# Patient Record
Sex: FEMALE | Race: WHITE | Employment: FULL TIME | URBAN - METROPOLITAN AREA
[De-identification: names, ages, dates, MRNs, and addresses within clinical notes are randomized per-mention and may not be internally consistent; named-entity substitution may affect disease eponyms.]

---

## 2024-01-26 ENCOUNTER — OFFICE VISIT (OUTPATIENT)
Dept: OBGYN CLINIC | Age: 34
End: 2024-01-26

## 2024-01-26 VITALS
HEIGHT: 67 IN | WEIGHT: 175 LBS | SYSTOLIC BLOOD PRESSURE: 132 MMHG | TEMPERATURE: 99.9 F | HEART RATE: 90 BPM | BODY MASS INDEX: 27.47 KG/M2 | DIASTOLIC BLOOD PRESSURE: 82 MMHG

## 2024-01-26 DIAGNOSIS — Z12.4 PAP SMEAR FOR CERVICAL CANCER SCREENING: ICD-10-CM

## 2024-01-26 DIAGNOSIS — Z01.411 ENCNTR FOR GYN EXAM (GENERAL) (ROUTINE) W ABNORMAL FINDINGS: Primary | ICD-10-CM

## 2024-01-26 DIAGNOSIS — Z11.3 ROUTINE SCREENING FOR STI (SEXUALLY TRANSMITTED INFECTION): ICD-10-CM

## 2024-01-26 DIAGNOSIS — N92.6 MISSED MENSES: ICD-10-CM

## 2024-01-26 DIAGNOSIS — Z32.01 POSITIVE PREGNANCY TEST: ICD-10-CM

## 2024-01-26 RX ORDER — TRETINOIN 0.5 MG/G
CREAM TOPICAL NIGHTLY
COMMUNITY

## 2024-01-26 ASSESSMENT — ENCOUNTER SYMPTOMS
DIARRHEA: 0
CONSTIPATION: 0
SORE THROAT: 0
VOMITING: 0
COUGH: 0
NAUSEA: 0
SHORTNESS OF BREATH: 0
ABDOMINAL PAIN: 0

## 2024-01-26 NOTE — PROGRESS NOTES
Annual Exam      CC:   Chief Complaint   Patient presents with    New Patient     amenorrhea       HPI:  33 y.o. G0 presents for her gynecologic annual exam.    Patient seen and examined. Doing well today.     Reports menses have been regular.  Occurring every 21 days.  Lasting 5 days.  Heavier bleeding first 2 days and then light.  Denies pain with menses.  [facility] 12/6/2023.     Medical history significant for acne.  Surgical history includes 4 knee surgeries.      Health Maintenance:  Birth control: None   Pregnancy plans: Actively trying   Safe relationship:  - together 4.5 years   Healthy diet: No specific plan   Exercise: 3-5 days a week     Screening:  Last pap smear: 2-3 years ago   History of abnormal pap smears: denies    Vaccines:  Gardasil vaccine: Has had   Flu vaccine: Has had   COVID-19 vaccine: Had series    Review of Systems:   Review of Systems   Constitutional:  Negative for chills and fever.   HENT:  Negative for congestion and sore throat.    Respiratory:  Negative for cough and shortness of breath.    Cardiovascular:  Negative for chest pain and palpitations.   Gastrointestinal:  Negative for abdominal pain, constipation, diarrhea, nausea and vomiting.   Genitourinary:  Positive for menstrual problem. Negative for dysuria, frequency, pelvic pain and vaginal discharge.   Neurological:  Negative for dizziness and headaches.   Breast: Denies skin changes, nipple discharge, lesions, dimpling, tenderness or palpable masses     Primary Care Physician: Alexandra Roque APRN - SANDRO    Obstetric History  OB History   No obstetric history on file.       GynecologicHistory  Menstrual History:  LMP: Patient's last menstrual period was 12/06/2023 (exact date).   Age of Menarche: 14  Menstrual Period: regular  Interval Between Menses: 21 days  Duration of Menses: 5 days  Menstrual Flow: moderate  Painful menses: denies    Sexual History:  Contraception: see above  Currently is sexually active  5

## 2024-01-27 LAB
REASON FOR REJECTION: NORMAL
REJECTED TEST: NORMAL

## 2024-01-28 LAB — BACTERIA UR CULT: NORMAL

## 2024-01-29 LAB
C TRACH DNA CVX QL NAA+PROBE: NEGATIVE
N GONORRHOEA DNA CERV MUCUS QL NAA+PROBE: NEGATIVE

## 2024-02-02 LAB
HPV HR 12 DNA SPEC QL NAA+PROBE: NOT DETECTED
HPV16 DNA SPEC QL NAA+PROBE: NOT DETECTED
HPV16+18+H RISK 12 DNA SPEC-IMP: NORMAL
HPV18 DNA SPEC QL NAA+PROBE: NOT DETECTED

## 2024-02-21 ENCOUNTER — INITIAL PRENATAL (OUTPATIENT)
Dept: OBGYN CLINIC | Age: 34
End: 2024-02-21
Payer: COMMERCIAL

## 2024-02-21 VITALS
SYSTOLIC BLOOD PRESSURE: 104 MMHG | WEIGHT: 176.2 LBS | DIASTOLIC BLOOD PRESSURE: 62 MMHG | BODY MASS INDEX: 27.6 KG/M2 | HEART RATE: 101 BPM

## 2024-02-21 DIAGNOSIS — Z34.01 ENCOUNTER FOR PRENATAL CARE IN FIRST TRIMESTER OF FIRST PREGNANCY: Primary | ICD-10-CM

## 2024-02-21 DIAGNOSIS — O21.9 NAUSEA AND VOMITING DURING PREGNANCY: ICD-10-CM

## 2024-02-21 PROCEDURE — 0500F INITIAL PRENATAL CARE VISIT: CPT | Performed by: OBSTETRICS & GYNECOLOGY

## 2024-02-21 PROCEDURE — 36415 COLL VENOUS BLD VENIPUNCTURE: CPT | Performed by: OBSTETRICS & GYNECOLOGY

## 2024-02-21 RX ORDER — PYRIDOXINE HCL (VITAMIN B6) 25 MG
25 TABLET ORAL 3 TIMES DAILY
Qty: 90 TABLET | Refills: 3 | Status: SHIPPED | OUTPATIENT
Start: 2024-02-21

## 2024-02-21 NOTE — PROGRESS NOTES
Maternal emotional well being screening form completed and reviewed with patient. Current score is 0.     
AIDED ANT CRUCIATE LIGM RPR/AGMNTJ/RCNSTJ Left 9/20/2018    LEFT KNEE ARTHROSCOPY REVISION OF ANTERIOR CRUCIATE LIGAMENT TEAR WITH HAMSTRING performed by Nader Moya MD at [facility] OR    TN REMOVAL IMPLANT DEEP Left 9/20/2018    REMOVAL OF PAINFUL HARDWARE LEFT KNEE performed by Nader Moya MD at [facility] OR       Family History:  Family History   Problem Relation Age of Onset    Cancer Mother         skin    Cancer Paternal Grandmother         cervical    Diabetes Paternal Grandmother     Cancer Paternal Grandfather         lung    Heart Surgery Maternal Grandmother        Social History:  Social History     Socioeconomic History    Marital status:      Spouse name: None    Number of children: None    Years of education: None    Highest education level: None   Tobacco Use    Smoking status: Never    Smokeless tobacco: Never   Vaping Use    Vaping Use: Never used   Substance and Sexual Activity    Alcohol use: Yes     Alcohol/week: 1.0 standard drink of alcohol     Types: 1 Glasses of wine per week    Drug use: No    Sexual activity: Yes     Partners: Male     Comment: one current     Social Determinants of Health     Financial Resource Strain: Low Risk  (9/1/2022)    Overall Financial Resource Strain (CARDIA)     Difficulty of Paying Living Expenses: Not hard at all   Food Insecurity: No Food Insecurity (9/1/2022)    Hunger Vital Sign     Worried About Running Out of Food in the Last Year: Never true     Ran Out of Food in the Last Year: Never true       Physical Exam:  /62   Pulse (!) 101   Wt 79.9 kg (176 lb 3.2 oz)   LMP 12/06/2023 (Exact Date)   BMI 27.60 kg/m²     Physical Exam  Vitals reviewed.   Constitutional:       General: She is not in acute distress.     Appearance: She is well-developed.   HENT:      Head: Normocephalic and atraumatic.   Eyes:      Conjunctiva/sclera: Conjunctivae normal.   Cardiovascular:      Rate and Rhythm: Normal rate.   Pulmonary:      Effort: Pulmonary effort

## 2024-02-22 LAB
ABO + RH BLD: NORMAL
AMPHETAMINES UR QL SCN>1000 NG/ML: NORMAL
BARBITURATES UR QL SCN>200 NG/ML: NORMAL
BASOPHILS # BLD: 0.1 K/UL (ref 0–0.2)
BASOPHILS NFR BLD: 0.6 %
BENZODIAZ UR QL SCN>200 NG/ML: NORMAL
BLD GP AB SCN SERPL QL: NORMAL
BUPRENORPHINE+NOR UR QL SCN: NORMAL
CANNABINOIDS UR QL SCN>50 NG/ML: NORMAL
COCAINE UR QL SCN: NORMAL
DEPRECATED RDW RBC AUTO: 12.9 % (ref 12.4–15.4)
DRUG SCREEN COMMENT UR-IMP: NORMAL
EOSINOPHIL # BLD: 0.1 K/UL (ref 0–0.6)
EOSINOPHIL NFR BLD: 0.6 %
FENTANYL SCREEN, URINE: NORMAL
HBV SURFACE AG SERPL QL IA: NORMAL
HCT VFR BLD AUTO: 34.8 % (ref 36–48)
HCV AB S/CO SERPL IA: 0.05 IV
HCV AB SERPL QL IA: NEGATIVE
HGB BLD-MCNC: 12.2 G/DL (ref 12–16)
HIV 1+2 AB+HIV1 P24 AG SERPL QL IA: NORMAL
HIV 2 AB SERPL QL IA: NORMAL
HIV1 AB SERPL QL IA: NORMAL
HIV1 P24 AG SERPL QL IA: NORMAL
LYMPHOCYTES # BLD: 1.8 K/UL (ref 1–5.1)
LYMPHOCYTES NFR BLD: 18.8 %
MCH RBC QN AUTO: 31.3 PG (ref 26–34)
MCHC RBC AUTO-ENTMCNC: 35 G/DL (ref 31–36)
MCV RBC AUTO: 89.6 FL (ref 80–100)
METHADONE UR QL SCN>300 NG/ML: NORMAL
MONOCYTES # BLD: 0.8 K/UL (ref 0–1.3)
MONOCYTES NFR BLD: 7.9 %
NEUTROPHILS # BLD: 7.1 K/UL (ref 1.7–7.7)
NEUTROPHILS NFR BLD: 72.1 %
OPIATES UR QL SCN>300 NG/ML: NORMAL
OXYCODONE UR QL SCN: NORMAL
PCP UR QL SCN>25 NG/ML: NORMAL
PH UR STRIP: 7 [PH]
PLATELET # BLD AUTO: 263 K/UL (ref 135–450)
PMV BLD AUTO: 9.5 FL (ref 5–10.5)
RBC # BLD AUTO: 3.88 M/UL (ref 4–5.2)
REAGIN+T PALLIDUM IGG+IGM SERPL-IMP: NORMAL
RUBV IGG SERPL IA-ACNC: >500 IU/ML
TSH SERPL DL<=0.005 MIU/L-ACNC: 2.32 UIU/ML (ref 0.27–4.2)
VZV IGG SER QL IA: NORMAL
WBC # BLD AUTO: 9.8 K/UL (ref 4–11)

## 2024-03-18 SDOH — ECONOMIC STABILITY: FOOD INSECURITY: WITHIN THE PAST 12 MONTHS, THE FOOD YOU BOUGHT JUST DIDN'T LAST AND YOU DIDN'T HAVE MONEY TO GET MORE.: NEVER TRUE

## 2024-03-18 SDOH — ECONOMIC STABILITY: FOOD INSECURITY: WITHIN THE PAST 12 MONTHS, YOU WORRIED THAT YOUR FOOD WOULD RUN OUT BEFORE YOU GOT MONEY TO BUY MORE.: NEVER TRUE

## 2024-03-18 SDOH — ECONOMIC STABILITY: HOUSING INSECURITY
IN THE LAST 12 MONTHS, WAS THERE A TIME WHEN YOU DID NOT HAVE A STEADY PLACE TO SLEEP OR SLEPT IN A SHELTER (INCLUDING NOW)?: NO

## 2024-03-18 SDOH — ECONOMIC STABILITY: INCOME INSECURITY: HOW HARD IS IT FOR YOU TO PAY FOR THE VERY BASICS LIKE FOOD, HOUSING, MEDICAL CARE, AND HEATING?: NOT HARD AT ALL

## 2024-03-20 ENCOUNTER — ROUTINE PRENATAL (OUTPATIENT)
Dept: OBGYN CLINIC | Age: 34
End: 2024-03-20

## 2024-03-20 VITALS
BODY MASS INDEX: 28.22 KG/M2 | WEIGHT: 179.8 LBS | HEIGHT: 67 IN | HEART RATE: 55 BPM | OXYGEN SATURATION: 100 % | SYSTOLIC BLOOD PRESSURE: 138 MMHG | TEMPERATURE: 97.2 F | DIASTOLIC BLOOD PRESSURE: 88 MMHG

## 2024-03-20 DIAGNOSIS — Z34.02 ENCOUNTER FOR PRENATAL CARE IN SECOND TRIMESTER OF FIRST PREGNANCY: Primary | ICD-10-CM

## 2024-03-20 DIAGNOSIS — Z36.9 ANTENATAL SCREENING ENCOUNTER: ICD-10-CM

## 2024-03-20 DIAGNOSIS — O21.9 NAUSEA AND VOMITING DURING PREGNANCY: ICD-10-CM

## 2024-03-20 PROCEDURE — 0502F SUBSEQUENT PRENATAL CARE: CPT | Performed by: OBSTETRICS & GYNECOLOGY

## 2024-03-20 ASSESSMENT — PATIENT HEALTH QUESTIONNAIRE - PHQ9
2. FEELING DOWN, DEPRESSED OR HOPELESS: NOT AT ALL
SUM OF ALL RESPONSES TO PHQ QUESTIONS 1-9: 0
SUM OF ALL RESPONSES TO PHQ QUESTIONS 1-9: 0
SUM OF ALL RESPONSES TO PHQ9 QUESTIONS 1 & 2: 0
1. LITTLE INTEREST OR PLEASURE IN DOING THINGS: NOT AT ALL
SUM OF ALL RESPONSES TO PHQ QUESTIONS 1-9: 0
SUM OF ALL RESPONSES TO PHQ QUESTIONS 1-9: 0

## 2024-03-20 NOTE — PROGRESS NOTES
Return OB Office Visit    CC:   Chief Complaint   Patient presents with    Routine Prenatal Visit     15 weeks       HPI:  Patient seen and examined.  Doing well without complaints.     Denies VB, LOF, ctx. Has not begun to feel fetal movement.  Denies headaches, vision changes, RUQ pain, increased LE edema.  Denies chest pain, shortness of breath, fever, chills.  Nausea and vomiting are improving.     Review of Systems: The following ROS was otherwise negative, except as noted in the HPI: constitutional, HEENT, respiratory, cardiovascular, gastrointestinal, genitourinary, skin, musculoskeletal, neurological, psych    Objective:  /88 (Site: Left Upper Arm, Position: Sitting, Cuff Size: Large Adult)   Pulse 55   Temp 97.2 °F (36.2 °C) (Temporal)   Ht 1.702 m (5' 7\")   Wt 81.6 kg (179 lb 12.8 oz)   LMP 2023 (Exact Date)   SpO2 100%   BMI 28.16 kg/m²     Physical Exam  Vitals reviewed.   Constitutional:       General: She is not in acute distress.     Appearance: She is well-developed.   HENT:      Head: Normocephalic and atraumatic.   Eyes:      Conjunctiva/sclera: Conjunctivae normal.   Cardiovascular:      Rate and Rhythm: Normal rate.   Pulmonary:      Effort: Pulmonary effort is normal. No respiratory distress.   Abdominal:      General: There is no distension.      Palpations: Abdomen is soft.      Tenderness: There is no abdominal tenderness. There is no guarding or rebound.   Musculoskeletal:         General: No swelling.   Skin:     General: Skin is warm and dry.   Neurological:      Mental Status: She is alert and oriented to person, place, and time.   Psychiatric:         Mood and Affect: Mood normal.         Behavior: Behavior normal.         Thought Content: Thought content normal.         FHR: 155 bpm by doppler    Assessment/Plan:   Alexandra Elmore is a 34 y.o.  at 15w0d who presents for routine OB visit    1. Encounter for prenatal care in second trimester of first pregnancy     -

## 2024-03-25 ENCOUNTER — NURSE ONLY (OUTPATIENT)
Dept: OBGYN CLINIC | Age: 34
End: 2024-03-25
Payer: COMMERCIAL

## 2024-03-25 DIAGNOSIS — Z34.02 ENCOUNTER FOR PRENATAL CARE IN SECOND TRIMESTER OF FIRST PREGNANCY: ICD-10-CM

## 2024-03-25 DIAGNOSIS — Z36.9 ANTENATAL SCREENING ENCOUNTER: ICD-10-CM

## 2024-03-25 PROCEDURE — 36415 COLL VENOUS BLD VENIPUNCTURE: CPT | Performed by: OBSTETRICS & GYNECOLOGY

## 2024-03-25 NOTE — PROGRESS NOTES
Blood draw from L Antecubital x 1 attempt without difficulty. 1 SST, 0 PST, 0 LAV tubes drawn. Patient tolerated well. Yudi Cedillo LPN

## 2024-03-27 LAB
# FETUSES US: NORMAL
AFP ADJ MOM AMN: 1.35
AFP SERPL-MCNC: 39 NG/ML
AGE - REPORTED: 33.5 YR
CURRENT SMOKER: NORMAL
FAMILY MEMBER DISEASES HX: NO
GA METHOD: NORMAL
GA: NORMAL WK
HCG MOM SERPL: 0.75
HCG SERPL-ACNC: NORMAL IU/L
HX OF HEREDITARY DISORDERS: NO
IDDM PATIENT QL: NO
INHIBIN A MOM SERPL: 0.59
INHIBIN A SERPL-MCNC: 86 PG/ML
INTEGRATED SCN PATIENT-IMP: NORMAL
PATHOLOGY STUDY: NORMAL
SPECIMEN DRAWN SERPL: NORMAL
U ESTRIOL MOM SERPL: 0.95
U ESTRIOL SERPL-MCNC: 1.09 NG/ML

## 2024-04-03 ENCOUNTER — TELEPHONE (OUTPATIENT)
Dept: OBGYN CLINIC | Age: 34
End: 2024-04-03

## 2024-04-03 NOTE — TELEPHONE ENCOUNTER
Thank you.   Please confirm with patient no headaches, vision changes and that her swelling decreases when she rests and utilizes her compression hose.

## 2024-04-03 NOTE — TELEPHONE ENCOUNTER
PT called stating that she feels completely fine but wanted Dr. Canchola to know she has \"cankles\". She is staying hydrated and and using compression socks.

## 2024-04-04 NOTE — TELEPHONE ENCOUNTER
Pt returned call to office and says she had no headache or vision changes. She says her feet are \" back to normal today\" She states oddly the swelling got better when she was on her feet. She thinks it may have been related to eating poorly ( salt) from [place]. Pt does utilize compression stockings.

## 2024-04-05 NOTE — TELEPHONE ENCOUNTER
I'm glad to hear it is better. Please keep an eye on it and let me know if anything changes.  Thank you.

## 2024-04-17 ENCOUNTER — ROUTINE PRENATAL (OUTPATIENT)
Dept: OBGYN CLINIC | Age: 34
End: 2024-04-17

## 2024-04-17 VITALS
DIASTOLIC BLOOD PRESSURE: 76 MMHG | SYSTOLIC BLOOD PRESSURE: 130 MMHG | BODY MASS INDEX: 28.51 KG/M2 | HEART RATE: 90 BPM | WEIGHT: 182 LBS

## 2024-04-17 DIAGNOSIS — O21.9 NAUSEA AND VOMITING DURING PREGNANCY: ICD-10-CM

## 2024-04-17 DIAGNOSIS — Z34.02 ENCOUNTER FOR PRENATAL CARE IN SECOND TRIMESTER OF FIRST PREGNANCY: Primary | ICD-10-CM

## 2024-04-17 PROCEDURE — 0502F SUBSEQUENT PRENATAL CARE: CPT | Performed by: OBSTETRICS & GYNECOLOGY

## 2024-04-17 NOTE — PROGRESS NOTES
noted.     The following values were obtained:              Fetal heart rate                                               130 bpm              BPD                                                                 4.76 cm                       94.1 %              Head Circumference                                       17.11 cm                     75.7 %               Abdominal Circumference                              13.88 cm                     55.7 %              Femur Length                                                  3.22 cm                       79.2 %              Humerus Length                                             3.07 cm                       86.7 %              Cerebellum                                                      2.01 cm                       54.0 %              Amniotic fluid DVP                                          3.32 cm              EFW                                                                306g                82.1 percentile     Subjective amniotic fluid volume is normal. Based on sonographic criteria, the estimated fetal age is 19 weeks and 6 days with EDC of 09/05/2024.  There is a 6 day discordance with the established EDC of 09/11/2024.  The patient has an anterior placenta that is adequate distance in relation to the internal cervical os. The evaluation of the lower uterine segment and cervix reveals normal appearing anatomy. Transvaginal cervical length is 3.84 cm with no funneling noted. The uterus is unremarkable/gravid. Maternal ovaries and adnexae are not well visualized due to the size of the uterus and patient's gravid state.     Normal Anatomy Seen:  4 chamber heart                                  Spine                                       CSP  LVOT                                                   Kidneys                                   Lateral ventricles  RVOT                                                  Umbilical arteries

## 2024-05-15 ENCOUNTER — ROUTINE PRENATAL (OUTPATIENT)
Dept: OBGYN CLINIC | Age: 34
End: 2024-05-15

## 2024-05-15 VITALS
SYSTOLIC BLOOD PRESSURE: 136 MMHG | TEMPERATURE: 97.4 F | WEIGHT: 189.8 LBS | DIASTOLIC BLOOD PRESSURE: 82 MMHG | BODY MASS INDEX: 29.73 KG/M2 | HEART RATE: 72 BPM

## 2024-05-15 DIAGNOSIS — O21.9 NAUSEA AND VOMITING DURING PREGNANCY: ICD-10-CM

## 2024-05-15 DIAGNOSIS — Z34.02 ENCOUNTER FOR PRENATAL CARE IN SECOND TRIMESTER OF FIRST PREGNANCY: Primary | ICD-10-CM

## 2024-05-15 DIAGNOSIS — O22.00 VARICOSE VEINS DURING PREGNANCY: ICD-10-CM

## 2024-05-15 PROCEDURE — 0502F SUBSEQUENT PRENATAL CARE: CPT | Performed by: OBSTETRICS & GYNECOLOGY

## 2024-05-15 NOTE — PROGRESS NOTES
Return OB Office Visit    CC:   Chief Complaint   Patient presents with    Routine Prenatal Visit    Pregnancy Ultrasound       HPI:  Patient seen and examined.  Doing well without complaints.     Denies VB, LOF, ctx. +FM.  Denies headaches, vision changes, RUQ pain, increased LE edema.  Denies chest pain, shortness of breath, fever, chills.  Nausea and vomiting are improving.     Reports increased varicose veins on her left leg.      Review of Systems: The following ROS was otherwise negative, except as noted in the HPI: constitutional, HEENT, respiratory, cardiovascular, gastrointestinal, genitourinary, skin, musculoskeletal, neurological, psych    Objective:  /82   Pulse 72   Temp 97.4 °F (36.3 °C) (Infrared)   Wt 86.1 kg (189 lb 12.8 oz)   LMP 12/06/2023 (Exact Date)   BMI 29.73 kg/m²     Physical Exam  Vitals reviewed.   Constitutional:       General: She is not in acute distress.     Appearance: She is well-developed.   HENT:      Head: Normocephalic and atraumatic.   Eyes:      Conjunctiva/sclera: Conjunctivae normal.   Cardiovascular:      Rate and Rhythm: Normal rate.   Pulmonary:      Effort: Pulmonary effort is normal. No respiratory distress.   Abdominal:      General: There is no distension.      Palpations: Abdomen is soft.      Tenderness: There is no abdominal tenderness. There is no guarding or rebound.   Musculoskeletal:         General: No swelling.   Skin:     General: Skin is warm and dry.   Neurological:      Mental Status: She is alert and oriented to person, place, and time.   Psychiatric:         Mood and Affect: Mood normal.         Behavior: Behavior normal.         Thought Content: Thought content normal.       Ultrasound:  OBSTETRICAL ULTRASOUND GROWTH     DATE: 5/15/24     PHYSICIAN: MONICA Canchola D.O.      SONOGRAPHER: FRANK Newell [facility]     INDICATION: Growth, F/U diaphragm and profile     TYPE OF SCAN: abdominal     FINDINGS:  A single viable intrauterine pregnancy is noted in

## 2024-06-12 ENCOUNTER — ROUTINE PRENATAL (OUTPATIENT)
Dept: OBGYN CLINIC | Age: 34
End: 2024-06-12
Payer: COMMERCIAL

## 2024-06-12 VITALS
TEMPERATURE: 98.2 F | DIASTOLIC BLOOD PRESSURE: 78 MMHG | WEIGHT: 194.6 LBS | SYSTOLIC BLOOD PRESSURE: 142 MMHG | BODY MASS INDEX: 30.48 KG/M2 | HEART RATE: 69 BPM

## 2024-06-12 DIAGNOSIS — Z34.02 ENCOUNTER FOR PRENATAL CARE IN SECOND TRIMESTER OF FIRST PREGNANCY: Primary | ICD-10-CM

## 2024-06-12 DIAGNOSIS — O22.00 VARICOSE VEINS DURING PREGNANCY: ICD-10-CM

## 2024-06-12 DIAGNOSIS — O21.9 NAUSEA AND VOMITING DURING PREGNANCY: ICD-10-CM

## 2024-06-12 DIAGNOSIS — R03.0 ELEVATED BLOOD PRESSURE READING WITHOUT DIAGNOSIS OF HYPERTENSION: ICD-10-CM

## 2024-06-12 PROCEDURE — 0502F SUBSEQUENT PRENATAL CARE: CPT | Performed by: OBSTETRICS & GYNECOLOGY

## 2024-06-12 PROCEDURE — 90471 IMMUNIZATION ADMIN: CPT | Performed by: OBSTETRICS & GYNECOLOGY

## 2024-06-12 PROCEDURE — 90715 TDAP VACCINE 7 YRS/> IM: CPT | Performed by: OBSTETRICS & GYNECOLOGY

## 2024-06-12 PROCEDURE — 36415 COLL VENOUS BLD VENIPUNCTURE: CPT | Performed by: OBSTETRICS & GYNECOLOGY

## 2024-06-12 NOTE — PROGRESS NOTES
.eastobgynglucose    Patient started drink at 8:19 and finished at  8:20 .  Patient tolerated drink well.   1 green tube drawn at 9:20.  # 50 grams of Glucola.  No complain of nausea and vomiting at this time, will continue to monitor.

## 2024-06-12 NOTE — PROGRESS NOTES
Return OB Office Visit    CC:   Chief Complaint   Patient presents with    Routine Prenatal Visit       HPI:  Patient seen and examined.  Doing well without complaints.     Denies VB, LOF, ctx. +FM.  Denies headaches, vision changes, RUQ pain, increased LE edema.  Denies chest pain, shortness of breath, fever, chills, nausea, vomiting.    Reports increased varicose veins on her left leg.      Review of Systems: The following ROS was otherwise negative, except as noted in the HPI: constitutional, HEENT, respiratory, cardiovascular, gastrointestinal, genitourinary, skin, musculoskeletal, neurological, psych    Objective:  BP (!) 144/82   Pulse 69   Temp 98.2 °F (36.8 °C) (Infrared)   Wt 88.3 kg (194 lb 9.6 oz)   LMP 2023 (Exact Date)   BMI 30.48 kg/m²     Physical Exam  Vitals reviewed.   Constitutional:       General: She is not in acute distress.     Appearance: She is well-developed.   HENT:      Head: Normocephalic and atraumatic.   Eyes:      Conjunctiva/sclera: Conjunctivae normal.   Cardiovascular:      Rate and Rhythm: Normal rate.   Pulmonary:      Effort: Pulmonary effort is normal. No respiratory distress.   Abdominal:      General: There is no distension.      Palpations: Abdomen is soft.      Tenderness: There is no abdominal tenderness. There is no guarding or rebound.   Musculoskeletal:         General: No swelling.   Skin:     General: Skin is warm and dry.   Neurological:      Mental Status: She is alert and oriented to person, place, and time.   Psychiatric:         Mood and Affect: Mood normal.         Behavior: Behavior normal.         Thought Content: Thought content normal.       FHR: 142 bpm via doppler    Assessment/Plan:   Alexandra Elmore is a 34 y.o.  at 27w0d who presents for routine OB visit    1. Encounter for prenatal care in second trimester of first pregnancy     - Doing well today without complaints     - Fetal wellbeing reassuring by FH and FHR today     - Maternal

## 2024-06-13 LAB
ALBUMIN SERPL-MCNC: 4 G/DL (ref 3.4–5)
ALBUMIN/GLOB SERPL: 1.6 {RATIO} (ref 1.1–2.2)
ALP SERPL-CCNC: 65 U/L (ref 40–129)
ALT SERPL-CCNC: 26 U/L (ref 10–40)
ANION GAP SERPL CALCULATED.3IONS-SCNC: 12 MMOL/L (ref 3–16)
AST SERPL-CCNC: 18 U/L (ref 15–37)
BASOPHILS # BLD: 0 K/UL (ref 0–0.2)
BASOPHILS NFR BLD: 0.3 %
BILIRUB SERPL-MCNC: <0.2 MG/DL (ref 0–1)
BUN SERPL-MCNC: 9 MG/DL (ref 7–20)
CALCIUM SERPL-MCNC: 9 MG/DL (ref 8.3–10.6)
CHLORIDE SERPL-SCNC: 103 MMOL/L (ref 99–110)
CO2 SERPL-SCNC: 22 MMOL/L (ref 21–32)
CREAT SERPL-MCNC: <0.5 MG/DL (ref 0.6–1.1)
CREAT UR-MCNC: 99.9 MG/DL (ref 28–259)
DEPRECATED RDW RBC AUTO: 13.3 % (ref 12.4–15.4)
EOSINOPHIL # BLD: 0.2 K/UL (ref 0–0.6)
EOSINOPHIL NFR BLD: 1.8 %
GFR SERPLBLD CREATININE-BSD FMLA CKD-EPI: >90 ML/MIN/{1.73_M2}
GLUCOSE 1H P 50 G GLC PO SERPL-MCNC: 84 MG/DL
GLUCOSE SERPL-MCNC: 84 MG/DL (ref 70–99)
HCT VFR BLD AUTO: 33.5 % (ref 36–48)
HGB BLD-MCNC: 11.4 G/DL (ref 12–16)
LYMPHOCYTES # BLD: 1.8 K/UL (ref 1–5.1)
LYMPHOCYTES NFR BLD: 15.7 %
MCH RBC QN AUTO: 31.4 PG (ref 26–34)
MCHC RBC AUTO-ENTMCNC: 33.9 G/DL (ref 31–36)
MCV RBC AUTO: 92.5 FL (ref 80–100)
MONOCYTES # BLD: 0.7 K/UL (ref 0–1.3)
MONOCYTES NFR BLD: 5.9 %
NEUTROPHILS # BLD: 8.6 K/UL (ref 1.7–7.7)
NEUTROPHILS NFR BLD: 76.3 %
PLATELET # BLD AUTO: 268 K/UL (ref 135–450)
PMV BLD AUTO: 9.5 FL (ref 5–10.5)
POTASSIUM SERPL-SCNC: 4 MMOL/L (ref 3.5–5.1)
PROT SERPL-MCNC: 6.5 G/DL (ref 6.4–8.2)
PROT UR-MCNC: 9 MG/DL
PROT/CREAT UR-RTO: 0.1 MG/DL
RBC # BLD AUTO: 3.62 M/UL (ref 4–5.2)
SODIUM SERPL-SCNC: 137 MMOL/L (ref 136–145)
URATE SERPL-MCNC: 3.9 MG/DL (ref 2.6–6)
WBC # BLD AUTO: 11.3 K/UL (ref 4–11)

## 2024-06-24 ENCOUNTER — OFFICE VISIT (OUTPATIENT)
Dept: FAMILY MEDICINE CLINIC | Age: 34
End: 2024-06-24
Payer: COMMERCIAL

## 2024-06-24 VITALS
BODY MASS INDEX: 30.61 KG/M2 | DIASTOLIC BLOOD PRESSURE: 88 MMHG | SYSTOLIC BLOOD PRESSURE: 130 MMHG | HEART RATE: 79 BPM | HEIGHT: 67 IN | OXYGEN SATURATION: 98 % | WEIGHT: 195 LBS | TEMPERATURE: 98.1 F

## 2024-06-24 DIAGNOSIS — B96.89 ACUTE BACTERIAL SINUSITIS: Primary | ICD-10-CM

## 2024-06-24 DIAGNOSIS — J01.90 ACUTE BACTERIAL SINUSITIS: Primary | ICD-10-CM

## 2024-06-24 PROCEDURE — 99213 OFFICE O/P EST LOW 20 MIN: CPT | Performed by: NURSE PRACTITIONER

## 2024-06-24 RX ORDER — AMOXICILLIN 875 MG/1
875 TABLET, COATED ORAL 2 TIMES DAILY
Qty: 20 TABLET | Refills: 0 | Status: SHIPPED | OUTPATIENT
Start: 2024-06-24 | End: 2024-07-04

## 2024-06-24 NOTE — PROGRESS NOTES
Alexandra Elmore (:  1990) is a 34 y.o. female,Established patient, here for evaluation of the following chief complaint(s):  Sinus Problem (Sinus infection 5 days /Sinus pressure, sinus drainage, mucous /Pt is pregnant )      Assessment & Plan   1. Acute bacterial sinusitis  Not progressing;  Begin Amox.  Encouraged steam, humidifier and fluids.  -     amoxicillin (AMOXIL) 875 MG tablet; Take 1 tablet by mouth 2 times daily for 10 days, Disp-20 tablet, R-0Normal      Return if symptoms worsen or fail to improve.       Subjective   HPI  Sinusitis  X 5 days  Not sleeping great- thinks wore self down  Congested  Yesterday drained all day  No fevers or chills  Cough- is better; Is productive  Sore throat has gone away  Sinus pressure  Ears pop with blowing nose  Benadryl- helped  Tylenol cold- didn't help    Review of Systems       Objective   Physical Exam  Vitals reviewed.   Constitutional:       Appearance: Normal appearance.   HENT:      Head: Normocephalic.      Right Ear: Tympanic membrane, ear canal and external ear normal.      Left Ear: Tympanic membrane, ear canal and external ear normal.      Nose: Congestion present.      Right Sinus: Maxillary sinus tenderness present.      Left Sinus: Maxillary sinus tenderness present.      Mouth/Throat:      Mouth: Mucous membranes are moist.      Pharynx: Oropharynx is clear. Uvula midline.   Cardiovascular:      Rate and Rhythm: Normal rate and regular rhythm.      Heart sounds: Normal heart sounds, S1 normal and S2 normal.   Pulmonary:      Effort: Pulmonary effort is normal.   Neurological:      Mental Status: She is alert.       An electronic signature was used to authenticate this note.    --Alexandra Roque, FATOUMATA - CNP

## 2024-06-26 ENCOUNTER — ROUTINE PRENATAL (OUTPATIENT)
Dept: OBGYN CLINIC | Age: 34
End: 2024-06-26

## 2024-06-26 VITALS
HEART RATE: 79 BPM | OXYGEN SATURATION: 99 % | HEIGHT: 67 IN | DIASTOLIC BLOOD PRESSURE: 86 MMHG | BODY MASS INDEX: 31.23 KG/M2 | SYSTOLIC BLOOD PRESSURE: 140 MMHG | WEIGHT: 199 LBS | TEMPERATURE: 98.1 F

## 2024-06-26 DIAGNOSIS — O21.9 NAUSEA AND VOMITING DURING PREGNANCY: ICD-10-CM

## 2024-06-26 DIAGNOSIS — Z34.03 ENCOUNTER FOR PRENATAL CARE IN THIRD TRIMESTER OF FIRST PREGNANCY: Primary | ICD-10-CM

## 2024-06-26 DIAGNOSIS — O22.00 VARICOSE VEINS DURING PREGNANCY: ICD-10-CM

## 2024-06-26 DIAGNOSIS — O13.3 GESTATIONAL HYPERTENSION, THIRD TRIMESTER: ICD-10-CM

## 2024-06-26 PROCEDURE — 0502F SUBSEQUENT PRENATAL CARE: CPT | Performed by: OBSTETRICS & GYNECOLOGY

## 2024-06-26 NOTE — PROGRESS NOTES
Return OB Office Visit    CC:   Chief Complaint   Patient presents with    Routine Prenatal Visit       HPI:  Pt seen and examined. No concerns/complaints. Denies VB, LOF, ctx. +FM. No HA, chest pain, SOB. No blurry vision.     Home BP have been normal -- 130s/70s.    Maternal wellness questionnaire reviewed - no concerns today. Score 0.     Objective:  BP (!) 140/86   Pulse 79   Temp 98.1 °F (36.7 °C) (Temporal)   Ht 1.702 m (5' 7\")   Wt 90.3 kg (199 lb)   LMP 2023 (Exact Date)   SpO2 99%   BMI 31.17 kg/m²   Gen: AO, NAD  Abd: Soft, NT  FHT: 152  FH: 29cm, unk by Leopolds    Assessment/Plan:  34 y.o.  at 29w0d (Estimated Date of Delivery: 24) presents for [facility] appointment:      Diagnosis Orders   1. Encounter for prenatal care in third trimester of first pregnancy        2. Nausea and vomiting during pregnancy        3. Varicose veins during pregnancy        4. Gestational hypertension, third trimester          Doing well today, routine care  - FWB reassuring  - pt now meets criteria for gHTN (elevated BP at 27 and 29 weeks), asymtpomatic otherwise and HELLP labs from last visit wnl. Reviewed diagnosis, continued home BP montioring with parameters to call and preE signs/symptoms discussed. Defer labs/meds today as home BP have been normal  - reviewed 3T labs, wnl  - continue baby aspirin daily    Dispo: RTC in 2 weeks  Nicole Maldonado MD

## 2024-07-10 ENCOUNTER — ROUTINE PRENATAL (OUTPATIENT)
Dept: OBGYN CLINIC | Age: 34
End: 2024-07-10

## 2024-07-10 VITALS
DIASTOLIC BLOOD PRESSURE: 75 MMHG | HEART RATE: 105 BPM | TEMPERATURE: 98.6 F | BODY MASS INDEX: 31.58 KG/M2 | WEIGHT: 201.6 LBS | SYSTOLIC BLOOD PRESSURE: 135 MMHG

## 2024-07-10 DIAGNOSIS — O22.00 VARICOSE VEINS DURING PREGNANCY: ICD-10-CM

## 2024-07-10 DIAGNOSIS — Z34.03 ENCOUNTER FOR PRENATAL CARE IN THIRD TRIMESTER OF FIRST PREGNANCY: Primary | ICD-10-CM

## 2024-07-10 DIAGNOSIS — O21.9 NAUSEA AND VOMITING DURING PREGNANCY: ICD-10-CM

## 2024-07-10 DIAGNOSIS — O13.3 GESTATIONAL HYPERTENSION, THIRD TRIMESTER: ICD-10-CM

## 2024-07-10 PROCEDURE — 0502F SUBSEQUENT PRENATAL CARE: CPT | Performed by: OBSTETRICS & GYNECOLOGY

## 2024-07-11 NOTE — PROGRESS NOTES
33 y/o  female at 31 weeks 0 days gestation with EDC 24 presents for prenatal visit.    Patient is established with Dr. Canchola.  Pregnancy is complicated by gestational HTN, varicose veins and nausea/vomiting (no current symptoms).  Home blood pressures:  120's/70-80's.    Admits to some constraints with work schedule.  Denies vaginal bleeding, loss of fluid, contractions and decreased fetal movement.   Denies headache, vision changes and RUQ pain.   Denies fever, chills, chest pain, shortness of breath, nausea, vomiting, diarrhea, dysuria and hematuria.   Admits to some constipation.   Maternal Wellness Questionnaire reviewed--no concerns.      Diagnosis Orders   1. Encounter for prenatal care in third trimester of first pregnancy        2. Gestational hypertension, third trimester        3. Nausea and vomiting during pregnancy        4. Varicose veins during pregnancy          Continue prenatal vitamin, ASA  Continue home blood pressures.   Pre-eclampsia precautions.   Weekly BPPs at 34 weeks gestation  24 hour urine and lab work in the next week.   Follow up prn and 2 weeks for prenatal visit

## 2024-07-15 ENCOUNTER — LAB (OUTPATIENT)
Dept: OBGYN CLINIC | Age: 34
End: 2024-07-15
Payer: COMMERCIAL

## 2024-07-15 DIAGNOSIS — Z34.03 ENCOUNTER FOR PRENATAL CARE IN THIRD TRIMESTER OF FIRST PREGNANCY: Primary | ICD-10-CM

## 2024-07-15 DIAGNOSIS — O13.3 GESTATIONAL HYPERTENSION, THIRD TRIMESTER: ICD-10-CM

## 2024-07-15 PROCEDURE — 36415 COLL VENOUS BLD VENIPUNCTURE: CPT | Performed by: OBSTETRICS & GYNECOLOGY

## 2024-07-15 NOTE — PROGRESS NOTES
Patient left 24 hour urine sample.     Blood draw from L Antecubital x 1 attempt without difficulty. 1 SST, 0 PST, 1 LAV tubes drawn. Patient tolerated well. Yudi Cedillo LPN

## 2024-07-16 LAB
ALBUMIN SERPL-MCNC: 3.6 G/DL (ref 3.4–5)
ALBUMIN/GLOB SERPL: 1.6 {RATIO} (ref 1.1–2.2)
ALP SERPL-CCNC: 81 U/L (ref 40–129)
ALT SERPL-CCNC: 18 U/L (ref 10–40)
ANION GAP SERPL CALCULATED.3IONS-SCNC: 14 MMOL/L (ref 3–16)
AST SERPL-CCNC: 14 U/L (ref 15–37)
BASOPHILS # BLD: 0 K/UL (ref 0–0.2)
BASOPHILS NFR BLD: 0.4 %
BILIRUB SERPL-MCNC: <0.2 MG/DL (ref 0–1)
BUN SERPL-MCNC: 10 MG/DL (ref 7–20)
CALCIUM SERPL-MCNC: 9 MG/DL (ref 8.3–10.6)
CHLORIDE SERPL-SCNC: 104 MMOL/L (ref 99–110)
CO2 SERPL-SCNC: 21 MMOL/L (ref 21–32)
CREAT 24H UR-MRATE: 1.8 G/24HR (ref 0.6–1.5)
CREAT SERPL-MCNC: 0.5 MG/DL (ref 0.6–1.1)
DEPRECATED RDW RBC AUTO: 13.5 % (ref 12.4–15.4)
EOSINOPHIL # BLD: 0.2 K/UL (ref 0–0.6)
EOSINOPHIL NFR BLD: 1.9 %
GFR SERPLBLD CREATININE-BSD FMLA CKD-EPI: >90 ML/MIN/{1.73_M2}
GLUCOSE SERPL-MCNC: 83 MG/DL (ref 70–99)
HCT VFR BLD AUTO: 34 % (ref 36–48)
HGB BLD-MCNC: 11.5 G/DL (ref 12–16)
LDH SERPL L TO P-CCNC: 190 U/L (ref 100–190)
LYMPHOCYTES # BLD: 2.4 K/UL (ref 1–5.1)
LYMPHOCYTES NFR BLD: 19.3 %
MCH RBC QN AUTO: 31.8 PG (ref 26–34)
MCHC RBC AUTO-ENTMCNC: 33.8 G/DL (ref 31–36)
MCV RBC AUTO: 93.9 FL (ref 80–100)
MONOCYTES # BLD: 0.7 K/UL (ref 0–1.3)
MONOCYTES NFR BLD: 6 %
NEUTROPHILS # BLD: 8.9 K/UL (ref 1.7–7.7)
NEUTROPHILS NFR BLD: 72.4 %
PLATELET # BLD AUTO: 251 K/UL (ref 135–450)
PMV BLD AUTO: 9.9 FL (ref 5–10.5)
POTASSIUM SERPL-SCNC: 4.2 MMOL/L (ref 3.5–5.1)
PROT 24H UR-MRATE: 0.2 G/24HR
PROT SERPL-MCNC: 5.9 G/DL (ref 6.4–8.2)
RBC # BLD AUTO: 3.62 M/UL (ref 4–5.2)
SODIUM SERPL-SCNC: 139 MMOL/L (ref 136–145)
SPECIMEN VOL 24H UR: 4050 ML
URATE SERPL-MCNC: 4.2 MG/DL (ref 2.6–6)
WBC # BLD AUTO: 12.3 K/UL (ref 4–11)

## 2024-07-24 ENCOUNTER — ROUTINE PRENATAL (OUTPATIENT)
Dept: OBGYN CLINIC | Age: 34
End: 2024-07-24

## 2024-07-24 VITALS
SYSTOLIC BLOOD PRESSURE: 140 MMHG | BODY MASS INDEX: 32.24 KG/M2 | TEMPERATURE: 98.5 F | HEART RATE: 97 BPM | WEIGHT: 205.4 LBS | HEIGHT: 67 IN | DIASTOLIC BLOOD PRESSURE: 68 MMHG | OXYGEN SATURATION: 99 %

## 2024-07-24 DIAGNOSIS — Z34.03 ENCOUNTER FOR PRENATAL CARE IN THIRD TRIMESTER OF FIRST PREGNANCY: Primary | ICD-10-CM

## 2024-07-24 DIAGNOSIS — O13.3 GESTATIONAL HYPERTENSION, THIRD TRIMESTER: ICD-10-CM

## 2024-07-24 DIAGNOSIS — O22.00 VARICOSE VEINS DURING PREGNANCY: ICD-10-CM

## 2024-07-24 DIAGNOSIS — O21.9 NAUSEA AND VOMITING DURING PREGNANCY: ICD-10-CM

## 2024-07-24 PROCEDURE — 0502F SUBSEQUENT PRENATAL CARE: CPT | Performed by: OBSTETRICS & GYNECOLOGY

## 2024-07-24 NOTE — PROGRESS NOTES
Return OB Office Visit    CC:   Chief Complaint   Patient presents with    Routine Prenatal Visit       HPI:  Pt seen and examined. No concerns/complaints. Denies VB, LOF, ctx. +FM.     Home BP have been fine, 130s/70 still.     Maternal wellness questionnaire reviewed - no concerns today.     Objective:  BP (!) 140/68   Pulse 97   Temp 98.5 °F (36.9 °C) (Temporal)   Ht 1.702 m (5' 7\")   Wt 93.2 kg (205 lb 6.4 oz)   LMP 2023 (Exact Date)   SpO2 99%   BMI 32.17 kg/m²   Gen: AO, NAD  Abd: Soft, NT  FHT: 139  FH: 32cm, unk by Leopolds    Assessment/Plan:  34 y.o.  at 33w0d (Estimated Date of Delivery: 24) presents for [facility] appointment:      Diagnosis Orders   1. Encounter for prenatal care in third trimester of first pregnancy        2. Gestational hypertension, third trimester        3. Nausea and vomiting during pregnancy        4. Varicose veins during pregnancy        5. Breech presentation, single or unspecified fetus          Doing well today, routine care  - FWB reassuring on US today  - 24: EFW 2332g (72%ile), BREECH  - mild range BP today, previously normal HELLP labs and 24hr urine protein  - q4wk growth and weekly ANFS scheduled  - continue baby aspirin  - will continue to follow fetal position and address delivery timing/mode fo delivery pending BP and fetal position    Dispo: RTC in 1 week  Nicole Maldonado MD

## 2024-07-29 ENCOUNTER — ROUTINE PRENATAL (OUTPATIENT)
Dept: OBGYN CLINIC | Age: 34
End: 2024-07-29
Payer: COMMERCIAL

## 2024-07-29 VITALS
TEMPERATURE: 99 F | WEIGHT: 207.2 LBS | BODY MASS INDEX: 32.45 KG/M2 | SYSTOLIC BLOOD PRESSURE: 142 MMHG | HEART RATE: 90 BPM | DIASTOLIC BLOOD PRESSURE: 86 MMHG

## 2024-07-29 DIAGNOSIS — O13.3 GESTATIONAL HYPERTENSION, THIRD TRIMESTER: ICD-10-CM

## 2024-07-29 DIAGNOSIS — Z34.03 ENCOUNTER FOR PRENATAL CARE IN THIRD TRIMESTER OF FIRST PREGNANCY: Primary | ICD-10-CM

## 2024-07-29 DIAGNOSIS — O21.9 NAUSEA AND VOMITING DURING PREGNANCY: ICD-10-CM

## 2024-07-29 DIAGNOSIS — O22.00 VARICOSE VEINS DURING PREGNANCY: ICD-10-CM

## 2024-07-29 PROCEDURE — 36415 COLL VENOUS BLD VENIPUNCTURE: CPT | Performed by: OBSTETRICS & GYNECOLOGY

## 2024-07-29 PROCEDURE — 0502F SUBSEQUENT PRENATAL CARE: CPT | Performed by: OBSTETRICS & GYNECOLOGY

## 2024-07-29 NOTE — PROGRESS NOTES
Return OB Office Visit    CC:   Chief Complaint   Patient presents with    Routine Prenatal Visit     No bleeding or cramping  No questions       HPI:  Patient seen and examined. Doing well today without complaints.     Denies VB, LOF, ctx. +FM.  Denies headaches, vision changes, RUQ pain, increased LE edema.  Denies chest pain, shortness of breath, fever, chills, nausea, vomiting.      Review of Systems: The following ROS was otherwise negative, except as noted in the HPI: constitutional, HEENT, respiratory, cardiovascular, gastrointestinal, genitourinary, skin, musculoskeletal, neurological, psych    Objective:  BP (!) 148/84 (Site: Left Upper Arm, Position: Sitting, Cuff Size: Medium Adult)   Pulse 90   Temp 99 °F (37.2 °C) (Infrared)   Wt 94 kg (207 lb 3.2 oz)   LMP 2023 (Exact Date)   BMI 32.45 kg/m²     Physical Exam  Vitals reviewed.   Constitutional:       General: She is not in acute distress.     Appearance: She is well-developed.   HENT:      Head: Normocephalic and atraumatic.   Eyes:      Conjunctiva/sclera: Conjunctivae normal.   Cardiovascular:      Rate and Rhythm: Normal rate.   Pulmonary:      Effort: Pulmonary effort is normal. No respiratory distress.   Abdominal:      General: There is no distension.      Palpations: Abdomen is soft.      Tenderness: There is no abdominal tenderness. There is no guarding or rebound.   Musculoskeletal:         General: No swelling.   Skin:     General: Skin is warm and dry.   Neurological:      Mental Status: She is alert and oriented to person, place, and time.   Psychiatric:         Mood and Affect: Mood normal.         Behavior: Behavior normal.         Thought Content: Thought content normal.         Ultrasound  OB ULTRASOUND:  BIOPHYSICAL PROFILE     DATE: 24     PHYSICIAN: MONICA Canchola D.O.     SONOGRAPHER: FRANK Newell RDMS     INDICATION: Fetal well being     TYPE OF SCAN: abdominal     BPP:   Tone: 2, Gross fetal movement: 2, Breathin,

## 2024-07-30 LAB
ALBUMIN SERPL-MCNC: 3.8 G/DL (ref 3.4–5)
ALBUMIN/GLOB SERPL: 1.4 {RATIO} (ref 1.1–2.2)
ALP SERPL-CCNC: 100 U/L (ref 40–129)
ALT SERPL-CCNC: 17 U/L (ref 10–40)
ANION GAP SERPL CALCULATED.3IONS-SCNC: 13 MMOL/L (ref 3–16)
AST SERPL-CCNC: 17 U/L (ref 15–37)
BASOPHILS # BLD: 0 K/UL (ref 0–0.2)
BASOPHILS NFR BLD: 0.3 %
BILIRUB SERPL-MCNC: <0.2 MG/DL (ref 0–1)
BUN SERPL-MCNC: 12 MG/DL (ref 7–20)
CALCIUM SERPL-MCNC: 9.6 MG/DL (ref 8.3–10.6)
CHLORIDE SERPL-SCNC: 102 MMOL/L (ref 99–110)
CO2 SERPL-SCNC: 21 MMOL/L (ref 21–32)
CREAT SERPL-MCNC: 0.6 MG/DL (ref 0.6–1.1)
CREAT UR-MCNC: 51.3 MG/DL (ref 28–259)
DEPRECATED RDW RBC AUTO: 13.1 % (ref 12.4–15.4)
EOSINOPHIL # BLD: 0.3 K/UL (ref 0–0.6)
EOSINOPHIL NFR BLD: 1.9 %
GFR SERPLBLD CREATININE-BSD FMLA CKD-EPI: >90 ML/MIN/{1.73_M2}
GLUCOSE SERPL-MCNC: 81 MG/DL (ref 70–99)
HCT VFR BLD AUTO: 36.2 % (ref 36–48)
HGB BLD-MCNC: 12 G/DL (ref 12–16)
LDH SERPL L TO P-CCNC: 208 U/L (ref 100–190)
LYMPHOCYTES # BLD: 2.1 K/UL (ref 1–5.1)
LYMPHOCYTES NFR BLD: 14.6 %
MCH RBC QN AUTO: 30.7 PG (ref 26–34)
MCHC RBC AUTO-ENTMCNC: 33.1 G/DL (ref 31–36)
MCV RBC AUTO: 92.8 FL (ref 80–100)
MONOCYTES # BLD: 1 K/UL (ref 0–1.3)
MONOCYTES NFR BLD: 7 %
NEUTROPHILS # BLD: 10.8 K/UL (ref 1.7–7.7)
NEUTROPHILS NFR BLD: 76.2 %
PLATELET # BLD AUTO: 278 K/UL (ref 135–450)
PMV BLD AUTO: 10.1 FL (ref 5–10.5)
POTASSIUM SERPL-SCNC: 4.2 MMOL/L (ref 3.5–5.1)
PROT SERPL-MCNC: 6.5 G/DL (ref 6.4–8.2)
PROT UR-MCNC: 5 MG/DL
PROT/CREAT UR-RTO: 0.1 MG/DL
RBC # BLD AUTO: 3.9 M/UL (ref 4–5.2)
SODIUM SERPL-SCNC: 136 MMOL/L (ref 136–145)
URATE SERPL-MCNC: 3.9 MG/DL (ref 2.6–6)
WBC # BLD AUTO: 14.2 K/UL (ref 4–11)

## 2024-08-07 ENCOUNTER — HOSPITAL ENCOUNTER (OUTPATIENT)
Age: 34
Discharge: HOME | End: 2024-08-07
Attending: OBSTETRICS & GYNECOLOGY | Admitting: OBSTETRICS & GYNECOLOGY
Payer: COMMERCIAL

## 2024-08-07 ENCOUNTER — ROUTINE PRENATAL (OUTPATIENT)
Dept: OBGYN CLINIC | Age: 34
End: 2024-08-07

## 2024-08-07 VITALS
WEIGHT: 203.8 LBS | TEMPERATURE: 98.8 F | BODY MASS INDEX: 31.92 KG/M2 | DIASTOLIC BLOOD PRESSURE: 94 MMHG | SYSTOLIC BLOOD PRESSURE: 152 MMHG | HEART RATE: 90 BPM

## 2024-08-07 VITALS
DIASTOLIC BLOOD PRESSURE: 84 MMHG | WEIGHT: 203 LBS | SYSTOLIC BLOOD PRESSURE: 135 MMHG | BODY MASS INDEX: 31.86 KG/M2 | HEART RATE: 71 BPM | TEMPERATURE: 97.9 F | HEIGHT: 67 IN | RESPIRATION RATE: 16 BRPM

## 2024-08-07 DIAGNOSIS — O13.3 GESTATIONAL HYPERTENSION, THIRD TRIMESTER: ICD-10-CM

## 2024-08-07 DIAGNOSIS — O22.00 VARICOSE VEINS DURING PREGNANCY: ICD-10-CM

## 2024-08-07 DIAGNOSIS — O21.9 NAUSEA AND VOMITING DURING PREGNANCY: ICD-10-CM

## 2024-08-07 DIAGNOSIS — Z34.03 ENCOUNTER FOR PRENATAL CARE IN THIRD TRIMESTER OF FIRST PREGNANCY: Primary | ICD-10-CM

## 2024-08-07 LAB
ALBUMIN SERPL-MCNC: 3.8 G/DL (ref 3.4–5)
ALBUMIN/GLOB SERPL: 1.2 {RATIO} (ref 1.1–2.2)
ALP SERPL-CCNC: 101 U/L (ref 40–129)
ALT SERPL-CCNC: 15 U/L (ref 10–40)
ANION GAP SERPL CALCULATED.3IONS-SCNC: 14 MMOL/L (ref 3–16)
APTT BLD: 23.2 SEC (ref 22.1–36.4)
AST SERPL-CCNC: 16 U/L (ref 15–37)
BASOPHILS # BLD: 0.1 K/UL (ref 0–0.2)
BASOPHILS NFR BLD: 0.4 %
BILIRUB SERPL-MCNC: <0.2 MG/DL (ref 0–1)
BILIRUB UR QL STRIP.AUTO: NEGATIVE
BUN SERPL-MCNC: 7 MG/DL (ref 7–20)
CALCIUM SERPL-MCNC: 9.4 MG/DL (ref 8.3–10.6)
CHLORIDE SERPL-SCNC: 99 MMOL/L (ref 99–110)
CLARITY UR: CLEAR
CO2 SERPL-SCNC: 19 MMOL/L (ref 21–32)
COLOR UR: NORMAL
CREAT SERPL-MCNC: <0.5 MG/DL (ref 0.6–1.1)
CREAT UR-MCNC: 14.2 MG/DL (ref 28–259)
DEPRECATED RDW RBC AUTO: 13.2 % (ref 12.4–15.4)
EOSINOPHIL # BLD: 0.1 K/UL (ref 0–0.6)
EOSINOPHIL NFR BLD: 0.6 %
FIBRINOGEN PPP-MCNC: 541 MG/DL (ref 227–534)
GFR SERPLBLD CREATININE-BSD FMLA CKD-EPI: >90 ML/MIN/{1.73_M2}
GLUCOSE SERPL-MCNC: 81 MG/DL (ref 70–99)
GLUCOSE UR STRIP.AUTO-MCNC: NEGATIVE MG/DL
HCT VFR BLD AUTO: 37.4 % (ref 36–48)
HGB BLD-MCNC: 12.4 G/DL (ref 12–16)
HGB UR QL STRIP.AUTO: NEGATIVE
INR PPP: 0.88 (ref 0.85–1.15)
KETONES UR STRIP.AUTO-MCNC: NEGATIVE MG/DL
LDH SERPL L TO P-CCNC: 217 U/L (ref 100–190)
LEUKOCYTE ESTERASE UR QL STRIP.AUTO: NEGATIVE
LYMPHOCYTES # BLD: 1.2 K/UL (ref 1–5.1)
LYMPHOCYTES NFR BLD: 8.7 %
MCH RBC QN AUTO: 30.7 PG (ref 26–34)
MCHC RBC AUTO-ENTMCNC: 33.1 G/DL (ref 31–36)
MCV RBC AUTO: 92.8 FL (ref 80–100)
MONOCYTES # BLD: 0.9 K/UL (ref 0–1.3)
MONOCYTES NFR BLD: 6.7 %
NEUTROPHILS # BLD: 11.6 K/UL (ref 1.7–7.7)
NEUTROPHILS NFR BLD: 83.6 %
NITRITE UR QL STRIP.AUTO: NEGATIVE
PH UR STRIP.AUTO: 7 [PH] (ref 5–8)
PLATELET # BLD AUTO: 243 K/UL (ref 135–450)
PMV BLD AUTO: 9.5 FL (ref 5–10.5)
POTASSIUM SERPL-SCNC: 3.9 MMOL/L (ref 3.5–5.1)
PROT SERPL-MCNC: 6.9 G/DL (ref 6.4–8.2)
PROT UR STRIP.AUTO-MCNC: NEGATIVE MG/DL
PROT UR-MCNC: <4 MG/DL
PROT/CREAT UR-RTO: ABNORMAL MG/DL
PROTHROMBIN TIME: 12.2 SEC (ref 11.9–14.9)
RBC # BLD AUTO: 4.04 M/UL (ref 4–5.2)
SODIUM SERPL-SCNC: 132 MMOL/L (ref 136–145)
SP GR UR STRIP.AUTO: <=1.005 (ref 1–1.03)
UA DIPSTICK W REFLEX MICRO PNL UR: NORMAL
URATE SERPL-MCNC: 4.5 MG/DL (ref 2.6–6)
URN SPEC COLLECT METH UR: NORMAL
UROBILINOGEN UR STRIP-ACNC: 0.2 E.U./DL
WBC # BLD AUTO: 13.9 K/UL (ref 4–11)

## 2024-08-07 PROCEDURE — 81003 URINALYSIS AUTO W/O SCOPE: CPT

## 2024-08-07 PROCEDURE — 80053 COMPREHEN METABOLIC PANEL: CPT

## 2024-08-07 PROCEDURE — 85610 PROTHROMBIN TIME: CPT

## 2024-08-07 PROCEDURE — 84550 ASSAY OF BLOOD/URIC ACID: CPT

## 2024-08-07 PROCEDURE — 84156 ASSAY OF PROTEIN URINE: CPT

## 2024-08-07 PROCEDURE — 83615 LACTATE (LD) (LDH) ENZYME: CPT

## 2024-08-07 PROCEDURE — 85025 COMPLETE CBC W/AUTO DIFF WBC: CPT

## 2024-08-07 PROCEDURE — 82570 ASSAY OF URINE CREATININE: CPT

## 2024-08-07 PROCEDURE — 85384 FIBRINOGEN ACTIVITY: CPT

## 2024-08-07 PROCEDURE — 85730 THROMBOPLASTIN TIME PARTIAL: CPT

## 2024-08-07 PROCEDURE — 99211 OFF/OP EST MAY X REQ PHY/QHP: CPT

## 2024-08-07 RX ORDER — ASPIRIN 81 MG/1
81 TABLET ORAL DAILY
COMMUNITY

## 2024-08-07 NOTE — PROGRESS NOTES
Pt educated on verbal/written discharge instructions. Pt verbalized understanding and denies questions/concerns at this time. Pt able to ambulate off unit, undelivered, not in labor and in stable condition. Pt accompanied by spouse, with all belongings.

## 2024-08-07 NOTE — H&P
Anion Gap 08/07/2024 14     Glucose 08/07/2024 81     BUN 08/07/2024 7     Creatinine 08/07/2024 <0.5 (L)     Est, Glom Filt Rate 08/07/2024 >90     Calcium 08/07/2024 9.4     Total Protein 08/07/2024 6.9     Albumin 08/07/2024 3.8     Albumin/Globulin Ratio 08/07/2024 1.2     Total Bilirubin 08/07/2024 <0.2     Alkaline Phosphatase 08/07/2024 101     ALT 08/07/2024 15     AST 08/07/2024 16     WBC 08/07/2024 13.9 (H)     RBC 08/07/2024 4.04     Hemoglobin 08/07/2024 12.4     Hematocrit 08/07/2024 37.4     MCV 08/07/2024 92.8     MCH 08/07/2024 30.7     MCHC 08/07/2024 33.1     RDW 08/07/2024 13.2     Platelets 08/07/2024 243     MPV 08/07/2024 9.5     Neutrophils % 08/07/2024 83.6     Lymphocytes % 08/07/2024 8.7     Monocytes % 08/07/2024 6.7     Eosinophils % 08/07/2024 0.6     Basophils % 08/07/2024 0.4     Neutrophils Absolute 08/07/2024 11.6 (H)     Lymphocytes Absolute 08/07/2024 1.2     Monocytes Absolute 08/07/2024 0.9     Eosinophils Absolute 08/07/2024 0.1     Basophils Absolute 08/07/2024 0.1     Protime 08/07/2024 12.2     INR 08/07/2024 0.88     aPTT 08/07/2024 23.2     Fibrinogen 08/07/2024 541 (H)     Uric Acid 08/07/2024 4.5     Color, UA 08/07/2024 Straw     Clarity, UA 08/07/2024 Clear     Glucose, Ur 08/07/2024 Negative     Bilirubin, Urine 08/07/2024 Negative     Ketones, Urine 08/07/2024 Negative     Specific Gravity, UA 08/07/2024 <=1.005     Blood, Urine 08/07/2024 Negative     pH, Urine 08/07/2024 7.0     Protein, UA 08/07/2024 Negative     Urobilinogen, Urine 08/07/2024 0.2     Nitrite, Urine 08/07/2024 Negative     Leukocyte Esterase, Urine 08/07/2024 Negative     Microscopic Examination 08/07/2024 Not Indicated     Urine Type 08/07/2024 NotGiven      US OB FOLLOW UP TRANSABDOMINAL APPROACH    Result Date: 8/7/2024  OBSTETRICAL ULTRASOUND GROWTH  DATE: 8/7/24  PHYSICIAN: MONICA Canchola D.O.  SONOGRAPHER: FRANK Newell RDMS  INDICATION: Growth, BPP  TYPE OF SCAN: abdominal  FINDINGS: A single

## 2024-08-07 NOTE — PROGRESS NOTES
PT sent over from office for PIH workup. PT ambulated to T1. Oriented to room and telephone. Pt provided urine sample and was placed on EFM. Pt states she is feeling the baby move. Denies any leaking of fluid or vaginal bleeding. Pt  denies feeling ctx. Denies any additional pain/concerns at this time.     Orders received from Dr. Toledo prior to pt arrival.

## 2024-08-07 NOTE — PROGRESS NOTES
NST:     Indications: GHTN    Baseline: 130  Variability: moderate in degree  Accelerations: Present   Decelerations: Absent              [place]: Contractions are absent    Category 1    Reactive: Yes      Impression: Reactive, reassuring    Dixie Canchola DO

## 2024-08-07 NOTE — PROGRESS NOTES
Dr. Canchola has seen pt and okay for pt to go home. Md would like pt to start 24 hour urine. Instructions reviewed with pt and pt verbalized understanding.

## 2024-08-07 NOTE — PROGRESS NOTES
The following values were obtained:                Fetal heart rate                                               134bpm                BPD                                                                 8.74cm                        61.2 %              Head Circumference                                       33.63cm                      92.4 %               Abdominal Circumference                              30.78cm                      48.7 %              Femur Length                                                  7.14cm                        81.6 %                Amniotic fluid index                                         15.00cm                EFW                                                                2742g              66.5 percentile     Amniotic fluid volume is normal. Based on sonographic criteria the estimated fetal age is 36weeks and 2days with EDC of 24. There is a 9 day discordance with the established EDC of 24.      The patient has an anterior placenta that is adequate distance in relation to the internal cervical os. The evaluation of the lower uterine segment and cervix reveals normal appearing anatomy.  The uterus is unremarkable/gravid.  Maternal ovaries and adnexae are not well visualized due to the size of the uterus and patient's gravid state.     Anatomy seen includes: heart, stomach, kidneys, bladder     IMPRESSION:  Single live IUP in the third trimester. Adequate interval fetal growth. BPP .     Imaging is limited secondary to fetal position.  The patient is well aware of the limitations of ultrasound in the detection of anomalies.       Assessment/Plan:   Alexandra Elmore is a 34 y.o.  at 35w0d who presents for routine OB visit    1. Encounter for prenatal care in second trimester of first pregnancy     - Doing well today without complaints     - Fetal wellbeing reassuring by US today - BPP      - Maternal wellness questionnaire reviewed - no concerns

## 2024-08-08 ENCOUNTER — TELEPHONE (OUTPATIENT)
Dept: OBGYN CLINIC | Age: 34
End: 2024-08-08

## 2024-08-08 RX ORDER — LABETALOL 100 MG/1
100 TABLET, FILM COATED ORAL 2 TIMES DAILY
Qty: 30 TABLET | Refills: 1 | Status: CANCELLED | OUTPATIENT
Start: 2024-08-08

## 2024-08-12 ENCOUNTER — HOSPITAL ENCOUNTER (OUTPATIENT)
Age: 34
Discharge: HOME | End: 2024-08-12
Payer: COMMERCIAL

## 2024-08-12 LAB
CREAT 24H UR-MRATE: 2.1 G/24HR (ref 0.6–1.5)
PROT 24H UR-MRATE: 0.23 G/24HR
SPECIMEN VOL 24H UR: 2600 ML

## 2024-08-12 PROCEDURE — 84156 ASSAY OF PROTEIN URINE: CPT

## 2024-08-13 ENCOUNTER — ROUTINE PRENATAL (OUTPATIENT)
Dept: OBGYN CLINIC | Age: 34
End: 2024-08-13

## 2024-08-13 VITALS
BODY MASS INDEX: 32.51 KG/M2 | WEIGHT: 207.6 LBS | SYSTOLIC BLOOD PRESSURE: 135 MMHG | DIASTOLIC BLOOD PRESSURE: 92 MMHG | HEART RATE: 89 BPM

## 2024-08-13 DIAGNOSIS — O22.00 VARICOSE VEINS DURING PREGNANCY: ICD-10-CM

## 2024-08-13 DIAGNOSIS — O13.3 GESTATIONAL HYPERTENSION, THIRD TRIMESTER: ICD-10-CM

## 2024-08-13 DIAGNOSIS — Z34.03 ENCOUNTER FOR PRENATAL CARE IN THIRD TRIMESTER OF FIRST PREGNANCY: Primary | ICD-10-CM

## 2024-08-13 DIAGNOSIS — O21.9 NAUSEA AND VOMITING DURING PREGNANCY: ICD-10-CM

## 2024-08-13 PROCEDURE — 0502F SUBSEQUENT PRENATAL CARE: CPT | Performed by: OBSTETRICS & GYNECOLOGY

## 2024-08-13 NOTE — PROGRESS NOTES
is noted in cephalic presentation. Cardiac and somatic activity are noted.      The following values were obtained:              Fetal heart rate 149bpm              Amniotic fluid index 8.39cm     IMPRESSION:   Single live IUP in the third trimester. BPP . BERE 8.39cm.     Imaging is limited secondary to fetal position.  The patient is well aware of the limitations of ultrasound in the detection of anomalies.      Assessment/Plan:   Alexandra Elmore is a 34 y.o.  at 35w6d who presents for routine OB visit    1. Encounter for prenatal care in third trimester of first pregnancy     - Doing well today without complaints     - Fetal wellbeing reassuring by US today - BPP      - Maternal wellness questionnaire reviewed - no concerns today     - Encourage PNV     - PNL: O pos/ab neg, R Immune, Varicella immune, HepB non-reactive, HepC negative, HIV non-reactive, Syphilis non-reactive, TSH 2.32, Hgb 12.2, Plt 263, UDS negative, UCx mixed ana, GCCT neg/neg, Pap NILM, neg HR HPV 2024     - Anatomy scan: Anterior placenta, no previa.  CL 3.91cm.  BERE wnl.  No gross anatomic abnormalities.  Sub-optimal profile, diaphragm.  Female     - Anatomy scan - completed 5/15/2024 - EFW 640g (43.0 %ile)     - Aneuploidy screening: MQS negative     - Carrier screening: Inheriscreen negative     - AFP: Negative     - Glucose screen 84     - CBC: Hgb 12.0, Plts 278     - Tdap today (24)     - Labor, decreased FM, VB, LOF precautions reviewed      - Return precautions reviewed      - Will follow-up in 1 week for GLENNA visit and ANFS     2. GHTN     - Initial BP today 150/95 with repeat 135/92     - Negative protein in urine     - Asymptomatic     - Taking a baby ASA daily     - HEELP labs stable     - 24 hr urine stable 203 > 234     - BP at home have been 130s/80s     - Labetalol 100mg BID - took 1 dose at home and her BP decreased from 130/80 to 110/70     - Will hold home Labetalol currently unless BP > 140/90

## 2024-08-16 LAB — GP B STREP SPEC QL CULT: NORMAL

## 2024-08-20 ENCOUNTER — ROUTINE PRENATAL (OUTPATIENT)
Dept: OBGYN CLINIC | Age: 34
End: 2024-08-20

## 2024-08-20 VITALS
WEIGHT: 206 LBS | HEART RATE: 86 BPM | BODY MASS INDEX: 32.33 KG/M2 | TEMPERATURE: 98.2 F | HEIGHT: 67 IN | DIASTOLIC BLOOD PRESSURE: 82 MMHG | SYSTOLIC BLOOD PRESSURE: 128 MMHG | OXYGEN SATURATION: 98 %

## 2024-08-20 DIAGNOSIS — O21.9 NAUSEA AND VOMITING DURING PREGNANCY: ICD-10-CM

## 2024-08-20 DIAGNOSIS — O13.3 GESTATIONAL HYPERTENSION, THIRD TRIMESTER: ICD-10-CM

## 2024-08-20 DIAGNOSIS — Z34.03 ENCOUNTER FOR PRENATAL CARE IN THIRD TRIMESTER OF FIRST PREGNANCY: Primary | ICD-10-CM

## 2024-08-20 DIAGNOSIS — O22.00 VARICOSE VEINS DURING PREGNANCY: ICD-10-CM

## 2024-08-20 PROCEDURE — 0502F SUBSEQUENT PRENATAL CARE: CPT | Performed by: OBSTETRICS & GYNECOLOGY

## 2024-08-20 NOTE — PROGRESS NOTES
Return OB Office Visit    CC:   Chief Complaint   Patient presents with    Routine Prenatal Visit    Pregnancy Ultrasound       HPI:  Pt seen and examined. No concerns/complaints. Denies VB, LOF, ctx. +FM. Some occasional [place].     Home Bps: 130s/80s -- has stopped labetalol due to low BP when on it.    Maternal wellness questionnaire reviewed - no concerns today.     Objective:  /82   Pulse 86   Temp 98.2 °F (36.8 °C) (Temporal)   Ht 1.702 m (5' 7\")   Wt 93.4 kg (206 lb)   LMP 2023 (Exact Date)   SpO2 98%   BMI 32.26 kg/m²   Gen: AO, NAD  Abd: Soft, NT  FHT: 137  SVE: fingeritp/50/-3    Assessment/Plan:  34 y.o.  at 36w6d (Estimated Date of Delivery: 24) presents for GLENNA appointment:      Diagnosis Orders   1. Encounter for prenatal care in third trimester of first pregnancy        2. Gestational hypertension, third trimester        3. Nausea and vomiting during pregnancy        4. Varicose veins during pregnancy          Doing well today, routine care  - FWB reassuring, BPP  today  - normotensive today, hold labetalol  - IOL scheduled 24 at 1900    Dispo: RTC for PPV, IOL this week  Nicole Maldonado MD

## 2024-08-22 ENCOUNTER — HOSPITAL ENCOUNTER (INPATIENT)
Age: 34
LOS: 3 days | Discharge: HOME | End: 2024-08-25
Attending: OBSTETRICS & GYNECOLOGY | Admitting: OBSTETRICS & GYNECOLOGY
Payer: COMMERCIAL

## 2024-08-22 ENCOUNTER — APPOINTMENT (OUTPATIENT)
Dept: LABOR AND DELIVERY | Age: 34
End: 2024-08-22
Payer: COMMERCIAL

## 2024-08-22 PROBLEM — Z3A.37 37 WEEKS GESTATION OF PREGNANCY: Status: ACTIVE | Noted: 2024-08-22

## 2024-08-22 LAB
ABO + RH BLD: NORMAL
ALBUMIN SERPL-MCNC: 3.7 G/DL (ref 3.4–5)
ALBUMIN/GLOB SERPL: 1.3 {RATIO} (ref 1.1–2.2)
ALP SERPL-CCNC: 111 U/L (ref 40–129)
ALT SERPL-CCNC: 14 U/L (ref 10–40)
AMPHETAMINES UR QL SCN>1000 NG/ML: NORMAL
ANION GAP SERPL CALCULATED.3IONS-SCNC: 12 MMOL/L (ref 3–16)
AST SERPL-CCNC: 15 U/L (ref 15–37)
BARBITURATES UR QL SCN>200 NG/ML: NORMAL
BASOPHILS # BLD: 0.1 K/UL (ref 0–0.2)
BASOPHILS NFR BLD: 0.4 %
BENZODIAZ UR QL SCN>200 NG/ML: NORMAL
BILIRUB SERPL-MCNC: <0.2 MG/DL (ref 0–1)
BLD GP AB SCN SERPL QL: NORMAL
BUN SERPL-MCNC: 11 MG/DL (ref 7–20)
BUPRENORPHINE+NOR UR QL SCN: NORMAL
CALCIUM SERPL-MCNC: 9.1 MG/DL (ref 8.3–10.6)
CANNABINOIDS UR QL SCN>50 NG/ML: NORMAL
CHLORIDE SERPL-SCNC: 104 MMOL/L (ref 99–110)
CO2 SERPL-SCNC: 19 MMOL/L (ref 21–32)
COCAINE UR QL SCN: NORMAL
CREAT SERPL-MCNC: 0.6 MG/DL (ref 0.6–1.1)
CREAT UR-MCNC: 46.1 MG/DL (ref 28–259)
DEPRECATED RDW RBC AUTO: 13 % (ref 12.4–15.4)
DRUG SCREEN COMMENT UR-IMP: NORMAL
EOSINOPHIL # BLD: 0.1 K/UL (ref 0–0.6)
EOSINOPHIL NFR BLD: 0.4 %
FENTANYL SCREEN, URINE: NORMAL
GFR SERPLBLD CREATININE-BSD FMLA CKD-EPI: >90 ML/MIN/{1.73_M2}
GLUCOSE SERPL-MCNC: 104 MG/DL (ref 70–99)
HCT VFR BLD AUTO: 34.1 % (ref 36–48)
HGB BLD-MCNC: 11.6 G/DL (ref 12–16)
LDH SERPL L TO P-CCNC: 238 U/L (ref 100–190)
LYMPHOCYTES # BLD: 2.2 K/UL (ref 1–5.1)
LYMPHOCYTES NFR BLD: 14.2 %
MCH RBC QN AUTO: 31.1 PG (ref 26–34)
MCHC RBC AUTO-ENTMCNC: 34 G/DL (ref 31–36)
MCV RBC AUTO: 91.6 FL (ref 80–100)
METHADONE UR QL SCN>300 NG/ML: NORMAL
MONOCYTES # BLD: 1.1 K/UL (ref 0–1.3)
MONOCYTES NFR BLD: 7 %
NEUTROPHILS # BLD: 12.1 K/UL (ref 1.7–7.7)
NEUTROPHILS NFR BLD: 78 %
OPIATES UR QL SCN>300 NG/ML: NORMAL
OXYCODONE UR QL SCN: NORMAL
PCP UR QL SCN>25 NG/ML: NORMAL
PH UR STRIP: 6 [PH]
PLATELET # BLD AUTO: 252 K/UL (ref 135–450)
PMV BLD AUTO: 9.8 FL (ref 5–10.5)
POTASSIUM SERPL-SCNC: 3.7 MMOL/L (ref 3.5–5.1)
PROT SERPL-MCNC: 6.5 G/DL (ref 6.4–8.2)
PROT UR-MCNC: 5 MG/DL
PROT/CREAT UR-RTO: 0.1 MG/DL
RBC # BLD AUTO: 3.72 M/UL (ref 4–5.2)
SODIUM SERPL-SCNC: 135 MMOL/L (ref 136–145)
URATE SERPL-MCNC: 4.7 MG/DL (ref 2.6–6)
WBC # BLD AUTO: 15.6 K/UL (ref 4–11)

## 2024-08-22 PROCEDURE — 83615 LACTATE (LD) (LDH) ENZYME: CPT

## 2024-08-22 PROCEDURE — 86850 RBC ANTIBODY SCREEN: CPT

## 2024-08-22 PROCEDURE — 84156 ASSAY OF PROTEIN URINE: CPT

## 2024-08-22 PROCEDURE — 59025 FETAL NON-STRESS TEST: CPT | Performed by: OBSTETRICS & GYNECOLOGY

## 2024-08-22 PROCEDURE — 85025 COMPLETE CBC W/AUTO DIFF WBC: CPT

## 2024-08-22 PROCEDURE — 82570 ASSAY OF URINE CREATININE: CPT

## 2024-08-22 PROCEDURE — 86780 TREPONEMA PALLIDUM: CPT

## 2024-08-22 PROCEDURE — 84550 ASSAY OF BLOOD/URIC ACID: CPT

## 2024-08-22 PROCEDURE — 1220000000 HC SEMI PRIVATE OB R&B

## 2024-08-22 PROCEDURE — 59200 INSERT CERVICAL DILATOR: CPT | Performed by: OBSTETRICS & GYNECOLOGY

## 2024-08-22 PROCEDURE — 86901 BLOOD TYPING SEROLOGIC RH(D): CPT

## 2024-08-22 PROCEDURE — 80307 DRUG TEST PRSMV CHEM ANLYZR: CPT

## 2024-08-22 PROCEDURE — 86900 BLOOD TYPING SEROLOGIC ABO: CPT

## 2024-08-22 PROCEDURE — 80053 COMPREHEN METABOLIC PANEL: CPT

## 2024-08-22 RX ORDER — SODIUM CHLORIDE 0.9 % (FLUSH) 0.9 %
5-40 SYRINGE (ML) INJECTION PRN
Status: DISCONTINUED | OUTPATIENT
Start: 2024-08-22 | End: 2024-08-24

## 2024-08-22 RX ORDER — SODIUM CHLORIDE 0.9 % (FLUSH) 0.9 %
5-40 SYRINGE (ML) INJECTION EVERY 12 HOURS SCHEDULED
Status: DISCONTINUED | OUTPATIENT
Start: 2024-08-22 | End: 2024-08-24

## 2024-08-22 RX ORDER — ONDANSETRON 4 MG/1
4 TABLET, ORALLY DISINTEGRATING ORAL EVERY 6 HOURS PRN
Status: DISCONTINUED | OUTPATIENT
Start: 2024-08-22 | End: 2024-08-24

## 2024-08-22 RX ORDER — SODIUM CHLORIDE, SODIUM LACTATE, POTASSIUM CHLORIDE, AND CALCIUM CHLORIDE .6; .31; .03; .02 G/100ML; G/100ML; G/100ML; G/100ML
500 INJECTION, SOLUTION INTRAVENOUS PRN
Status: DISCONTINUED | OUTPATIENT
Start: 2024-08-22 | End: 2024-08-24

## 2024-08-22 RX ORDER — ONDANSETRON 2 MG/ML
4 INJECTION INTRAMUSCULAR; INTRAVENOUS EVERY 6 HOURS PRN
Status: DISCONTINUED | OUTPATIENT
Start: 2024-08-22 | End: 2024-08-24

## 2024-08-22 RX ORDER — METHYLERGONOVINE MALEATE 0.2 MG/ML
200 INJECTION INTRAVENOUS PRN
Status: DISCONTINUED | OUTPATIENT
Start: 2024-08-22 | End: 2024-08-24

## 2024-08-22 RX ORDER — NALBUPHINE HYDROCHLORIDE 10 MG/ML
10 INJECTION, SOLUTION INTRAMUSCULAR; INTRAVENOUS; SUBCUTANEOUS
Status: DISCONTINUED | OUTPATIENT
Start: 2024-08-22 | End: 2024-08-24

## 2024-08-22 RX ORDER — TERBUTALINE SULFATE 1 MG/ML
0.25 INJECTION, SOLUTION SUBCUTANEOUS
Status: ACTIVE | OUTPATIENT
Start: 2024-08-22 | End: 2024-08-23

## 2024-08-22 RX ORDER — ACETAMINOPHEN 325 MG/1
650 TABLET ORAL EVERY 4 HOURS PRN
Status: DISCONTINUED | OUTPATIENT
Start: 2024-08-22 | End: 2024-08-24

## 2024-08-22 RX ORDER — MISOPROSTOL 100 UG/1
400 TABLET ORAL PRN
Status: DISCONTINUED | OUTPATIENT
Start: 2024-08-22 | End: 2024-08-24

## 2024-08-22 RX ORDER — SEVOFLURANE 250 ML/250ML
1 LIQUID RESPIRATORY (INHALATION) CONTINUOUS PRN
Status: DISCONTINUED | OUTPATIENT
Start: 2024-08-22 | End: 2024-08-24

## 2024-08-22 RX ORDER — SODIUM CHLORIDE, SODIUM LACTATE, POTASSIUM CHLORIDE, CALCIUM CHLORIDE 600; 310; 30; 20 MG/100ML; MG/100ML; MG/100ML; MG/100ML
INJECTION, SOLUTION INTRAVENOUS CONTINUOUS
Status: DISCONTINUED | OUTPATIENT
Start: 2024-08-22 | End: 2024-08-24

## 2024-08-22 RX ORDER — TRANEXAMIC ACID 10 MG/ML
1000 INJECTION, SOLUTION INTRAVENOUS
Status: ACTIVE | OUTPATIENT
Start: 2024-08-22 | End: 2024-08-23

## 2024-08-22 RX ORDER — CARBOPROST TROMETHAMINE 250 UG/ML
250 INJECTION, SOLUTION INTRAMUSCULAR PRN
Status: DISCONTINUED | OUTPATIENT
Start: 2024-08-22 | End: 2024-08-24

## 2024-08-22 RX ORDER — SODIUM CHLORIDE 9 MG/ML
25 INJECTION, SOLUTION INTRAVENOUS PRN
Status: DISCONTINUED | OUTPATIENT
Start: 2024-08-22 | End: 2024-08-24

## 2024-08-23 ENCOUNTER — ANESTHESIA (OUTPATIENT)
Dept: LABOR AND DELIVERY | Age: 34
End: 2024-08-23
Payer: COMMERCIAL

## 2024-08-23 ENCOUNTER — ANESTHESIA EVENT (OUTPATIENT)
Dept: LABOR AND DELIVERY | Age: 34
End: 2024-08-23
Payer: COMMERCIAL

## 2024-08-23 PROBLEM — O13.3 GESTATIONAL HYPERTENSION W/O SIGNIFICANT PROTEINURIA IN 3RD TRIMESTER: Status: ACTIVE | Noted: 2024-08-23

## 2024-08-23 LAB — REAGIN+T PALLIDUM IGG+IGM SERPL-IMP: NORMAL

## 2024-08-23 PROCEDURE — 2580000003 HC RX 258: Performed by: OBSTETRICS & GYNECOLOGY

## 2024-08-23 PROCEDURE — 6360000002 HC RX W HCPCS: Performed by: NURSE ANESTHETIST, CERTIFIED REGISTERED

## 2024-08-23 PROCEDURE — 1220000000 HC SEMI PRIVATE OB R&B

## 2024-08-23 PROCEDURE — 2500000003 HC RX 250 WO HCPCS: Performed by: NURSE ANESTHETIST, CERTIFIED REGISTERED

## 2024-08-23 PROCEDURE — 6360000002 HC RX W HCPCS: Performed by: OBSTETRICS & GYNECOLOGY

## 2024-08-23 PROCEDURE — 7200000001 HC VAGINAL DELIVERY

## 2024-08-23 PROCEDURE — 3700000025 EPIDURAL BLOCK: Performed by: ANESTHESIOLOGY

## 2024-08-23 RX ORDER — TERBUTALINE SULFATE 1 MG/ML
0.25 INJECTION, SOLUTION SUBCUTANEOUS
Status: DISCONTINUED | OUTPATIENT
Start: 2024-08-23 | End: 2024-08-24

## 2024-08-23 RX ORDER — METHYLERGONOVINE MALEATE 0.2 MG/ML
200 INJECTION INTRAVENOUS PRN
Status: DISCONTINUED | OUTPATIENT
Start: 2024-08-23 | End: 2024-08-24

## 2024-08-23 RX ORDER — BUPIVACAINE HYDROCHLORIDE 2.5 MG/ML
INJECTION, SOLUTION EPIDURAL; INFILTRATION; INTRACAUDAL PRN
Status: DISCONTINUED | OUTPATIENT
Start: 2024-08-23 | End: 2024-08-23 | Stop reason: SDUPTHER

## 2024-08-23 RX ORDER — SODIUM CHLORIDE, SODIUM LACTATE, POTASSIUM CHLORIDE, AND CALCIUM CHLORIDE .6; .31; .03; .02 G/100ML; G/100ML; G/100ML; G/100ML
500 INJECTION, SOLUTION INTRAVENOUS PRN
Status: DISCONTINUED | OUTPATIENT
Start: 2024-08-23 | End: 2024-08-24

## 2024-08-23 RX ORDER — TRANEXAMIC ACID 10 MG/ML
1000 INJECTION, SOLUTION INTRAVENOUS
Status: DISCONTINUED | OUTPATIENT
Start: 2024-08-23 | End: 2024-08-24

## 2024-08-23 RX ORDER — SODIUM CHLORIDE, SODIUM LACTATE, POTASSIUM CHLORIDE, CALCIUM CHLORIDE 600; 310; 30; 20 MG/100ML; MG/100ML; MG/100ML; MG/100ML
INJECTION, SOLUTION INTRAVENOUS CONTINUOUS
Status: DISCONTINUED | OUTPATIENT
Start: 2024-08-23 | End: 2024-08-24

## 2024-08-23 RX ORDER — ONDANSETRON 4 MG/1
4 TABLET, ORALLY DISINTEGRATING ORAL EVERY 6 HOURS PRN
Status: DISCONTINUED | OUTPATIENT
Start: 2024-08-23 | End: 2024-08-24

## 2024-08-23 RX ORDER — MISOPROSTOL 100 UG/1
400 TABLET ORAL PRN
Status: DISCONTINUED | OUTPATIENT
Start: 2024-08-23 | End: 2024-08-24

## 2024-08-23 RX ORDER — SODIUM CHLORIDE 0.9 % (FLUSH) 0.9 %
5-40 SYRINGE (ML) INJECTION EVERY 12 HOURS SCHEDULED
Status: DISCONTINUED | OUTPATIENT
Start: 2024-08-23 | End: 2024-08-24

## 2024-08-23 RX ORDER — SODIUM CHLORIDE 0.9 % (FLUSH) 0.9 %
5-40 SYRINGE (ML) INJECTION PRN
Status: DISCONTINUED | OUTPATIENT
Start: 2024-08-23 | End: 2024-08-24

## 2024-08-23 RX ORDER — SODIUM CHLORIDE 9 MG/ML
25 INJECTION, SOLUTION INTRAVENOUS PRN
Status: DISCONTINUED | OUTPATIENT
Start: 2024-08-23 | End: 2024-08-24

## 2024-08-23 RX ORDER — CARBOPROST TROMETHAMINE 250 UG/ML
250 INJECTION, SOLUTION INTRAMUSCULAR PRN
Status: DISCONTINUED | OUTPATIENT
Start: 2024-08-23 | End: 2024-08-24

## 2024-08-23 RX ORDER — ONDANSETRON 2 MG/ML
4 INJECTION INTRAMUSCULAR; INTRAVENOUS EVERY 6 HOURS PRN
Status: DISCONTINUED | OUTPATIENT
Start: 2024-08-23 | End: 2024-08-24

## 2024-08-23 RX ADMIN — Medication 15 ML/HR: at 15:22

## 2024-08-23 RX ADMIN — BUPIVACAINE HYDROCHLORIDE 7 ML: 2.5 INJECTION, SOLUTION EPIDURAL; INFILTRATION; INTRACAUDAL; PERINEURAL at 15:16

## 2024-08-23 RX ADMIN — BUPIVACAINE HYDROCHLORIDE 10 ML: 2.5 INJECTION, SOLUTION EPIDURAL; INFILTRATION; INTRACAUDAL; PERINEURAL at 18:43

## 2024-08-23 RX ADMIN — Medication 1 MILLI-UNITS/MIN: at 08:00

## 2024-08-23 RX ADMIN — NALBUPHINE HYDROCHLORIDE 10 MG: 10 INJECTION, SOLUTION INTRAMUSCULAR; INTRAVENOUS; SUBCUTANEOUS at 02:45

## 2024-08-23 RX ADMIN — SODIUM CHLORIDE, POTASSIUM CHLORIDE, SODIUM LACTATE AND CALCIUM CHLORIDE 500 ML: 600; 310; 30; 20 INJECTION, SOLUTION INTRAVENOUS at 20:34

## 2024-08-23 RX ADMIN — Medication 166.7 ML: at 23:46

## 2024-08-23 RX ADMIN — SODIUM CHLORIDE, POTASSIUM CHLORIDE, SODIUM LACTATE AND CALCIUM CHLORIDE: 600; 310; 30; 20 INJECTION, SOLUTION INTRAVENOUS at 07:57

## 2024-08-23 ASSESSMENT — PAIN SCALES - GENERAL: PAINLEVEL_OUTOF10: 5

## 2024-08-23 NOTE — FLOWSHEET NOTE
08/22/24 2018   Provider Notification   Name of Team Member Notified Moni DOMÍNGUEZ   Treatment Team Role Attending Provider   Method of Communication Page   Response Waiting for response   Notification Time 2018

## 2024-08-23 NOTE — FLOWSHEET NOTE
08/22/24 2021   Provider Notification   Name of Team Member Notified Moni DOMÍNGUEZ   Treatment Team Role Attending Provider   Method of Communication Call  (received)   Notification Time 2021     Aware of pts admission, FHR strip and ctx pattern, Bps since admission and recent SVE.     MD states she is coming in to place welsh bulb

## 2024-08-23 NOTE — PROGRESS NOTES
Labor Progress Note  [facility]    Subjective:   Patient is seen and examined. Doing well, no concerns/complaints.  Comfortable with epidural.     Objective:  /83   Pulse 59   Temp 98.4 °F (36.9 °C) (Axillary)   Resp 18   Ht 1.702 m (5' 7\")   Wt 93.4 kg (206 lb)   LMP 12/06/2023 (Exact Date)   SpO2 97%   BMI 32.26 kg/m²     Cervix:  Dilation (cm): 4-5  Effacement: 70  Cervical Characteristics: mid-position  Station: -1  Presentation: Vertex  OB Examiner: MONICA Canchola DO     Baseline: 125  Variability: moderate in degree  Accelerations: Present  Decelerations: Absent                 Late decelerations in certain positions after strip review, currently without decelerations    [place]: Contractions are present every 2-3 min    Category 1    Reactive: Yes      Infusions:    lactated ringers IV soln      sodium chloride      oxytocin      oxytocin 4 therese-units/min (08/23/24 1900)    lactated ringers IV soln 125 mL/hr at 08/23/24 1900    sodium chloride      oxytocin      oxytocin      nitrous oxide 50%       ?  Assessment/Plan:  1. Intrapartum  - FHTs reassuring   - Will decrease pit due to contraction frequency and intermittent late decelerations throughout review with adequate cervical change  - Continue expectant mgmt.    Dixie Canchola DO

## 2024-08-23 NOTE — FLOWSHEET NOTE
08/22/24 2300   Handoff   Communication Given Transfer Handoff   Handoff Given To Nedra JEAN   Handoff Received From Carrington RN   Handoff Communication Face to Face;In department   Time Handoff Given 2300

## 2024-08-23 NOTE — FLOWSHEET NOTE
08/23/24 1950 08/23/24 1952 08/23/24 1953   Fetal Heart Rate   Interventions  --    (pit decreased to 2mu) Positioned on Right Side;Peanut Ball   Cervical Exam   Dilation (cm) 4  (4-5)  --   --    Effacement 70  --   --    Station -1  --   --    Station (Labor Curve Graph) 6  --   --    OB Examiner Jesika DOMÍNGUEZ  --   --

## 2024-08-23 NOTE — FLOWSHEET NOTE
08/22/24 2059   Fetal Heart Rate   Interventions   (welsh bulb placed by MD)     60ml/60/ml    Orders for low dose pitocin to be started at 0000

## 2024-08-23 NOTE — PLAN OF CARE
Problem: Vaginal Birth or  Section  Goal: Fetal and maternal status remain reassuring during the birth process  Description:  Birth OB-Pregnancy care plan goal which identifies if the fetal and maternal status remain reassuring during the birth process  2024 by Adalgisa Pardo RN  Outcome: Progressing  2024 by Rachana Shultz RN  Outcome: Progressing     Problem: Pain  Goal: Verbalizes/displays adequate comfort level or baseline comfort level  2024 by Adalgisa Pardo RN  Outcome: Progressing  2024 by Rachana Shultz RN  Outcome: Progressing     Problem: Infection - Adult  Goal: Absence of infection at discharge  Outcome: Progressing  Goal: Absence of infection during hospitalization  Outcome: Progressing  Goal: Absence of fever/infection during anticipated neutropenic period  Outcome: Progressing     Problem: Safety - Adult  Goal: Free from fall injury  2024 by Adalgisa Pardo RN  Outcome: Progressing  2024 by Rachana Shultz RN  Outcome: Progressing

## 2024-08-23 NOTE — ANESTHESIA PRE PROCEDURE
Department of Anesthesiology  Preprocedure Note       Name:  Alexandra Elmore   Age:  34 y.o.  :  1990                                          MRN:  [id number]         Date:  2024      Surgeon: * No surgeons listed *    Procedure: * No procedures listed *    Medications prior to admission:   Prior to Admission medications    Medication Sig Start Date End Date Taking? Authorizing Provider   aspirin 81 MG EC tablet Take 1 tablet by mouth daily   Yes Luis Trejo MD   Prenatal Vit-Fe Fumarate-FA (PRENATAL VITAMINS PO) Take by mouth   Yes ProviderLuis MD       Current medications:    Current Facility-Administered Medications   Medication Dose Route Frequency Provider Last Rate Last Admin    terbutaline (BRETHINE) injection 0.25 mg  0.25 mg SubCUTAneous Once PRN [place], Estelita L, DO        lactated ringers IV soln infusion   IntraVENous Continuous Moni, Estelita L, DO        lactated ringers bolus 500 mL  500 mL IntraVENous PRN [place], Estelita L, DO        Or    lactated ringers bolus 500 mL  500 mL IntraVENous PRN Moni, Estelita L, DO        sodium chloride flush 0.9 % injection 5-40 mL  5-40 mL IntraVENous 2 times per day [place], Estelita L, DO        sodium chloride flush 0.9 % injection 5-40 mL  5-40 mL IntraVENous PRN [place], Estelita L, DO        0.9 % sodium chloride infusion  25 mL IntraVENous PRN [place], Estelita L, DO        ondansetron (ZOFRAN) injection 4 mg  4 mg IntraVENous Q6H PRN [place], Estelita L, DO        Or    ondansetron (ZOFRAN-ODT) disintegrating tablet 4 mg  4 mg Oral Q6H PRN Moni, Estelita L, DO        oxytocin (PITOCIN) 30 units in 500 mL infusion  87.3 therese-units/min IntraVENous Continuous PRN Moni, Estelita L, DO        And    oxytocin (PITOCIN) 10 unit bolus from the bag  10 Units IntraVENous PRN [place], Estelita L, DO        methylergonovine (METHERGINE) injection 200 mcg  200 mcg IntraMUSCular PRN Moni, Estelita L, DO

## 2024-08-23 NOTE — PROGRESS NOTES
Labor Progress Note  [facility]    Subjective:   Patient is seen and examined. Doing well, no concerns/complaints.  Denies headaches, vision changes, RUQ pain.     Objective:  /88   Pulse 68   Temp 98.4 °F (36.9 °C) (Oral)   Resp 18   Ht 1.702 m (5' 7\")   Wt 93.4 kg (206 lb)   LMP 12/06/2023 (Exact Date)   SpO2 97%   BMI 32.26 kg/m²     Cervix:  Dilation (cm): 4 (FB out at this time)  Effacement: 60  Cervical Characteristics: Posterior  Station: -3  Presentation: Vertex  OB Examiner: MERNA Lindsay RN    Baseline: 135  Variability: moderate in degree  Accelerations: Present  Decelerations: Absent                      [place]: Contractions are present every 3-4 minutes    Category 1    Reactive: Yes      Infusions:    lactated ringers IV soln      sodium chloride      oxytocin      oxytocin 3 therese-units/min (08/23/24 0928)    lactated ringers IV soln 125 mL/hr at 08/23/24 0757    sodium chloride      oxytocin      oxytocin      nitrous oxide 50%       ?  Assessment/Plan:  1. Intrapartum  - FHTs reassuring   - Continue pitocin per protocol  - Continue expectant mgmt.    Dixie Canchola, DO

## 2024-08-23 NOTE — H&P
Obstetrics History and Physical    HPI: Alexandra Elmore is a 34 y.o.  at 37w1d who presents for scheduled induction of labor due to GHTN. She is doing well this evening. Denies vaginal bleeding. Denies leaking fluid. Denies contractions. Endorses fetal movement.    Pregnancy has been complicated by GHTN, Nausea, Varicose veins.      Prenatal Flow:     - PNL: O pos/ab neg, R Immune, Varicella immune, HepB non-reactive, HepC negative, HIV non-reactive, Syphilis non-reactive, TSH 2.32, Hgb 12.2, Plt 263, UDS negative, UCx mixed ana, GCCT neg/neg, Pap NILM, neg HR HPV 2024     - Anatomy scan: Anterior placenta, no previa.  CL 3.91cm.  BERE wnl.  No gross anatomic abnormalities.  Sub-optimal profile, diaphragm.  Female     - Anatomy scan - completed 5/15/2024 - EFW 640g (43.0 %ile)     - Aneuploidy screening: MQS negative     - Carrier screening: Inheriscreen negative     - AFP: Negative     - Glucose screen 84     - CBC: Hgb 12.0, Plts 278     - Tdap done 24   - GBS: negative       Review of Systems: The following ROS was otherwise negative, except as noted in the HPI: constitutional, HEENT, respiratory, cardiovascular, gastrointestinal, genitourinary, skin, musculoskeletal, neurological, psych    OBGYN Provider : DO Jesika    Obstetrical History:  OB History    Para Term  AB Living   1 0 0 0 0 0   SAB IAB Ectopic Molar Multiple Live Births   0 0 0 0 0 0      # Outcome Date GA Lbr Jorge/2nd Weight Sex Type Anes PTL Lv   1 Current                Past Medical History:   Past Medical History:   Diagnosis Date    Chicken pox     Hypertension     gestational hypertension -G1       Medications:  No current facility-administered medications on file prior to encounter.     Current Outpatient Medications on File Prior to Encounter   Medication Sig Dispense Refill    aspirin 81 MG EC tablet Take 1 tablet by mouth daily      Prenatal Vit-Fe Fumarate-FA (PRENATAL VITAMINS PO) Take by mouth    oriented, normal speech, no focal deficits, moves extremities appropriately  Psych: Appropriate, normal affect, appears stated age  Osteopathic: No TART changes    Fetal Heart Monitoring:  FHT: 145 baseline, mod julien, +accels, neg decels  [place]: 2-3 min, mild     Labs:  Admission on 08/22/2024   Component Date Value    WBC 08/22/2024 15.6 (H)     RBC 08/22/2024 3.72 (L)     Hemoglobin 08/22/2024 11.6 (L)     Hematocrit 08/22/2024 34.1 (L)     MCV 08/22/2024 91.6     MCH 08/22/2024 31.1     MCHC 08/22/2024 34.0     RDW 08/22/2024 13.0     Platelets 08/22/2024 252     MPV 08/22/2024 9.8     Neutrophils % 08/22/2024 78.0     Lymphocytes % 08/22/2024 14.2     Monocytes % 08/22/2024 7.0     Eosinophils % 08/22/2024 0.4     Basophils % 08/22/2024 0.4     Neutrophils Absolute 08/22/2024 12.1 (H)     Lymphocytes Absolute 08/22/2024 2.2     Monocytes Absolute 08/22/2024 1.1     Eosinophils Absolute 08/22/2024 0.1     Basophils Absolute 08/22/2024 0.1     ABO/Rh 08/22/2024 O POS     Antibody Screen 08/22/2024 NEG     Amphetamine Screen, Ur 08/22/2024 Neg     Barbiturate Screen, Ur 08/22/2024 Neg     Benzodiazepine Screen, U* 08/22/2024 Neg     Cannabinoid Scrn, Ur 08/22/2024 Neg     Cocaine Metabolite Scree* 08/22/2024 Neg     Opiate Screen, Urine 08/22/2024 Neg     Phencyclidine (PCP), Scr* 08/22/2024 Neg     Methadone Screen, Urine 08/22/2024 Neg     Oxycodone Urine 08/22/2024 Neg     Buprenorphine Urine 08/22/2024 Neg     pH, Urine 08/22/2024 6.0     FENTANYL SCREEN, URINE 08/22/2024 Neg     Drug Screen Comment: 08/22/2024 see below     Sodium 08/22/2024 135 (L)     Potassium 08/22/2024 3.7     Chloride 08/22/2024 104     CO2 08/22/2024 19 (L)     Anion Gap 08/22/2024 12     Glucose 08/22/2024 104 (H)     BUN 08/22/2024 11     Creatinine 08/22/2024 0.6     Est, Glom Filt Rate 08/22/2024 >90     Calcium 08/22/2024 9.1     Total Protein 08/22/2024 6.5     Albumin 08/22/2024 3.7     Albumin/Globulin Ratio 08/22/2024 1.3

## 2024-08-23 NOTE — ANESTHESIA PROCEDURE NOTES
Epidural Block    Patient location during procedure: OB  Start time: 8/23/2024 3:01 PM  End time: 8/23/2024 3:22 PM  Reason for block: labor epidural  Staffing  Performed: resident/CRNA   Resident/CRNA: Arnaud Griggs APRN - CRNA  Performed by: Arnaud Griggs APRN - CRNA  Authorized by: All Arnold MD    Epidural  Patient position: sitting  Prep: ChloraPrep  Patient monitoring: continuous pulse ox  Approach: midline  Location: L3-4  Injection technique: LUIS FERNANDO saline  Provider prep: mask and sterile gloves  Needle  Needle type: Tuohy   Needle gauge: 17 G  Needle length: 3.5 in  Needle insertion depth: 6 cm  Catheter type: side hole  Catheter size: 19 G  Catheter at skin depth: 11 cm  Test dose: negativeCatheter Secured: tegaderm and tape  Assessment  Sensory level: T8  Hemodynamics: stable  Attempts: 1  Outcomes: uncomplicated and patient tolerated procedure well  Additional Notes  Sitting, Sterile prep/drape, 1%Xylo at L3-4, 17ga Tuohy with LUIS FERNANDO, 25ga Pencan for w/+CSF for DPE, Pencan removed, Catheter inserted, negative test dose, sterile dressing applied.   Preanesthetic Checklist  Completed: patient identified, IV checked, site marked, risks and benefits discussed, surgical/procedural consents, equipment checked, pre-op evaluation, timeout performed, anesthesia consent given, oxygen available and monitors applied/VS acknowledged

## 2024-08-23 NOTE — PROGRESS NOTES
Labor Progress Note  [facility]    Subjective:   Patient is seen and examined. Doing well, no concerns/complaints.       Objective:  /88   Pulse 71   Temp 98.5 °F (36.9 °C) (Oral)   Resp 18   Ht 1.702 m (5' 7\")   Wt 93.4 kg (206 lb)   LMP 12/06/2023 (Exact Date)   SpO2 97%   BMI 32.26 kg/m²     Cervix:  Dilation (cm): 3-4  Effacement: 60  Cervical Characteristics: Posterior  Station: -2  Presentation: Vertex  OB Examiner: MONICA Canchola DO     Baseline: 135  Variability: moderate in degree  Accelerations: Present  Decelerations: Absent                  [place]: Contractions are present every 2-3 minutes    Category 1    Reactive: Yes    Procedure: AROM performed with clear fluid noted      Infusions:    lactated ringers IV soln      sodium chloride      oxytocin      oxytocin 7 therese-units/min (08/23/24 1321)    lactated ringers IV soln 125 mL/hr at 08/23/24 0757    sodium chloride      oxytocin      oxytocin      nitrous oxide 50%       ?  Assessment/Plan:  1. Intrapartum  - FHTs reassuring   - Continue expectant mgmt.    Dixie Canchola DO

## 2024-08-23 NOTE — FLOWSHEET NOTE
08/22/24 2007   Cervical Exam   Dilation (cm) 1   Effacement 30   Cervical Characteristics Firm   Station -3   Station (Labor Curve Graph) 8   Presentation Vertex   OB Examiner Rack RN     Will call MD for further orders.

## 2024-08-23 NOTE — FLOWSHEET NOTE
pt at 37+1 weeks admitted to [facility] for IOL d/t gHTN(no meds).   , Corky at bedside for support.   EFM and toco placed.   VS taken.    24   Maternal Vitals (MEW-T)   Temp 97.7 °F (36.5 °C)  --    Temp Source Oral  --    Pulse 96 89   Heart Rate Source Monitor Monitor   Respirations 18 18   BP (!) 143/92 138/83   BP Method Automatic Automatic   Patient Position Semi fowlers Semi fowlers   SpO2 99 %  --    Pulse Oximeter Device Mode Intermittent  --    Pulse Oximeter Device Location Right;Finger  --    Level of Consciousness Alert  --    MEW-T Score 0  --    Pain Assessment   Pain Assessment None - Denies Pain  --      Consents signed.  Admission completed.   Care plan and education initiated.   PIV placed, labs drawn and sent.

## 2024-08-24 LAB
ALBUMIN SERPL-MCNC: 3 G/DL (ref 3.4–5)
ALBUMIN/GLOB SERPL: 1.1 {RATIO} (ref 1.1–2.2)
ALP SERPL-CCNC: 98 U/L (ref 40–129)
ALT SERPL-CCNC: 12 U/L (ref 10–40)
ANION GAP SERPL CALCULATED.3IONS-SCNC: 10 MMOL/L (ref 3–16)
AST SERPL-CCNC: 25 U/L (ref 15–37)
BILIRUB SERPL-MCNC: 0.5 MG/DL (ref 0–1)
BUN SERPL-MCNC: 9 MG/DL (ref 7–20)
CALCIUM SERPL-MCNC: 9 MG/DL (ref 8.3–10.6)
CHLORIDE SERPL-SCNC: 104 MMOL/L (ref 99–110)
CO2 SERPL-SCNC: 20 MMOL/L (ref 21–32)
CREAT SERPL-MCNC: 0.6 MG/DL (ref 0.6–1.1)
DEPRECATED RDW RBC AUTO: 13 % (ref 12.4–15.4)
GFR SERPLBLD CREATININE-BSD FMLA CKD-EPI: >90 ML/MIN/{1.73_M2}
GLUCOSE SERPL-MCNC: 79 MG/DL (ref 70–99)
HCT VFR BLD AUTO: 35.9 % (ref 36–48)
HGB BLD-MCNC: 12 G/DL (ref 12–16)
MCH RBC QN AUTO: 31 PG (ref 26–34)
MCHC RBC AUTO-ENTMCNC: 33.4 G/DL (ref 31–36)
MCV RBC AUTO: 92.8 FL (ref 80–100)
PLATELET # BLD AUTO: 218 K/UL (ref 135–450)
PMV BLD AUTO: 10 FL (ref 5–10.5)
POTASSIUM SERPL-SCNC: 3.9 MMOL/L (ref 3.5–5.1)
PROT SERPL-MCNC: 5.8 G/DL (ref 6.4–8.2)
RBC # BLD AUTO: 3.86 M/UL (ref 4–5.2)
SODIUM SERPL-SCNC: 134 MMOL/L (ref 136–145)
WBC # BLD AUTO: 20.9 K/UL (ref 4–11)

## 2024-08-24 PROCEDURE — 80053 COMPREHEN METABOLIC PANEL: CPT

## 2024-08-24 PROCEDURE — 59400 OBSTETRICAL CARE: CPT | Performed by: OBSTETRICS & GYNECOLOGY

## 2024-08-24 PROCEDURE — 85027 COMPLETE CBC AUTOMATED: CPT

## 2024-08-24 PROCEDURE — 36415 COLL VENOUS BLD VENIPUNCTURE: CPT

## 2024-08-24 PROCEDURE — 10907ZC DRAINAGE OF AMNIOTIC FLUID, THERAPEUTIC FROM PRODUCTS OF CONCEPTION, VIA NATURAL OR ARTIFICIAL OPENING: ICD-10-PCS | Performed by: OBSTETRICS & GYNECOLOGY

## 2024-08-24 PROCEDURE — 1220000000 HC SEMI PRIVATE OB R&B

## 2024-08-24 PROCEDURE — 51701 INSERT BLADDER CATHETER: CPT

## 2024-08-24 PROCEDURE — 6370000000 HC RX 637 (ALT 250 FOR IP): Performed by: OBSTETRICS & GYNECOLOGY

## 2024-08-24 PROCEDURE — 2580000003 HC RX 258: Performed by: OBSTETRICS & GYNECOLOGY

## 2024-08-24 PROCEDURE — 0KQM0ZZ REPAIR PERINEUM MUSCLE, OPEN APPROACH: ICD-10-PCS | Performed by: OBSTETRICS & GYNECOLOGY

## 2024-08-24 PROCEDURE — 3E033VJ INTRODUCTION OF OTHER HORMONE INTO PERIPHERAL VEIN, PERCUTANEOUS APPROACH: ICD-10-PCS | Performed by: OBSTETRICS & GYNECOLOGY

## 2024-08-24 RX ORDER — ACETAMINOPHEN 500 MG
1000 TABLET ORAL EVERY 8 HOURS PRN
Status: DISCONTINUED | OUTPATIENT
Start: 2024-08-24 | End: 2024-08-25 | Stop reason: HOSPADM

## 2024-08-24 RX ORDER — LANOLIN 100 %
OINTMENT (GRAM) TOPICAL PRN
Status: DISCONTINUED | OUTPATIENT
Start: 2024-08-24 | End: 2024-08-25 | Stop reason: HOSPADM

## 2024-08-24 RX ORDER — OXYCODONE HYDROCHLORIDE 5 MG/1
5 TABLET ORAL EVERY 6 HOURS PRN
Status: DISCONTINUED | OUTPATIENT
Start: 2024-08-24 | End: 2024-08-25 | Stop reason: HOSPADM

## 2024-08-24 RX ORDER — OXYCODONE HYDROCHLORIDE 5 MG/1
10 TABLET ORAL EVERY 6 HOURS PRN
Status: DISCONTINUED | OUTPATIENT
Start: 2024-08-24 | End: 2024-08-25 | Stop reason: HOSPADM

## 2024-08-24 RX ORDER — FAMOTIDINE 10 MG/ML
20 INJECTION, SOLUTION INTRAVENOUS 2 TIMES DAILY
Status: DISCONTINUED | OUTPATIENT
Start: 2024-08-24 | End: 2024-08-25 | Stop reason: HOSPADM

## 2024-08-24 RX ORDER — ONDANSETRON 2 MG/ML
4 INJECTION INTRAMUSCULAR; INTRAVENOUS EVERY 6 HOURS PRN
Status: DISCONTINUED | OUTPATIENT
Start: 2024-08-24 | End: 2024-08-25 | Stop reason: HOSPADM

## 2024-08-24 RX ORDER — SODIUM CHLORIDE 9 MG/ML
INJECTION, SOLUTION INTRAVENOUS PRN
Status: DISCONTINUED | OUTPATIENT
Start: 2024-08-24 | End: 2024-08-25 | Stop reason: HOSPADM

## 2024-08-24 RX ORDER — BENZOCAINE/MENTHOL 6 MG-10 MG
LOZENGE MUCOUS MEMBRANE
Status: DISCONTINUED | OUTPATIENT
Start: 2024-08-24 | End: 2024-08-25 | Stop reason: HOSPADM

## 2024-08-24 RX ORDER — DOCUSATE SODIUM 100 MG/1
100 CAPSULE, LIQUID FILLED ORAL 2 TIMES DAILY
Status: DISCONTINUED | OUTPATIENT
Start: 2024-08-24 | End: 2024-08-25 | Stop reason: HOSPADM

## 2024-08-24 RX ORDER — SODIUM CHLORIDE 0.9 % (FLUSH) 0.9 %
5-40 SYRINGE (ML) INJECTION PRN
Status: DISCONTINUED | OUTPATIENT
Start: 2024-08-24 | End: 2024-08-25 | Stop reason: HOSPADM

## 2024-08-24 RX ORDER — ONDANSETRON 4 MG/1
4 TABLET, ORALLY DISINTEGRATING ORAL EVERY 6 HOURS PRN
Status: DISCONTINUED | OUTPATIENT
Start: 2024-08-24 | End: 2024-08-25 | Stop reason: HOSPADM

## 2024-08-24 RX ORDER — SODIUM CHLORIDE 0.9 % (FLUSH) 0.9 %
5-40 SYRINGE (ML) INJECTION EVERY 12 HOURS SCHEDULED
Status: DISCONTINUED | OUTPATIENT
Start: 2024-08-24 | End: 2024-08-25 | Stop reason: HOSPADM

## 2024-08-24 RX ORDER — IBUPROFEN 800 MG/1
800 TABLET, FILM COATED ORAL EVERY 8 HOURS PRN
Status: DISCONTINUED | OUTPATIENT
Start: 2024-08-24 | End: 2024-08-25 | Stop reason: HOSPADM

## 2024-08-24 RX ADMIN — Medication: at 01:44

## 2024-08-24 RX ADMIN — ACETAMINOPHEN 1000 MG: 500 TABLET ORAL at 10:27

## 2024-08-24 RX ADMIN — ACETAMINOPHEN 1000 MG: 500 TABLET ORAL at 01:44

## 2024-08-24 RX ADMIN — IBUPROFEN 800 MG: 800 TABLET, FILM COATED ORAL at 14:07

## 2024-08-24 RX ADMIN — WITCH HAZEL: 500 SOLUTION RECTAL; TOPICAL at 01:44

## 2024-08-24 RX ADMIN — DOCUSATE SODIUM 100 MG: 100 CAPSULE, LIQUID FILLED ORAL at 20:31

## 2024-08-24 RX ADMIN — ACETAMINOPHEN 1000 MG: 500 TABLET ORAL at 20:31

## 2024-08-24 RX ADMIN — DOCUSATE SODIUM 100 MG: 100 CAPSULE, LIQUID FILLED ORAL at 10:27

## 2024-08-24 RX ADMIN — IBUPROFEN 800 MG: 800 TABLET, FILM COATED ORAL at 05:54

## 2024-08-24 RX ADMIN — Medication 10 ML: at 20:33

## 2024-08-24 ASSESSMENT — PAIN DESCRIPTION - LOCATION: LOCATION: PERINEUM

## 2024-08-24 ASSESSMENT — PAIN DESCRIPTION - DESCRIPTORS: DESCRIPTORS: DISCOMFORT;DULL

## 2024-08-24 ASSESSMENT — PAIN SCALES - GENERAL: PAINLEVEL_OUTOF10: 2

## 2024-08-24 ASSESSMENT — PAIN - FUNCTIONAL ASSESSMENT: PAIN_FUNCTIONAL_ASSESSMENT: ACTIVITIES ARE NOT PREVENTED

## 2024-08-24 NOTE — FLOWSHEET NOTE
08/23/24 2330 08/23/24 2340   Fetal Heart Rate   Interventions   (room set up for delivery)   (Female infant delivered at 2340)

## 2024-08-24 NOTE — FLOWSHEET NOTE
08/23/24 2310 08/23/24 2311 08/23/24 2312   Fetal Heart Rate   Interventions   (tilted-left side) Pitocin Off   (tilted-right side)      08/23/24 2314   Fetal Heart Rate   Interventions IV Fluid Increase     MD remains at bedside pushing with pt

## 2024-08-24 NOTE — LACTATION NOTE
This note was copied from a baby's chart.  LACTATION CONSULTATION  Initial Lactation Consult:   Referred by: Census- feeding preference     Name: Girl Alexandra Elmore       MRN: [id number]         YOB: 2023   Time of Birth: 11:40 PM   Gestational age: Gestational Age: 37w2d   Birth Weight: Birth Weight: 2.938 kg (6 lb 7.6 oz) Most Recent Weight: Weight: 2.938 kg (6 lb 7.6 oz) (Filed from Delivery Summary)   Weight Change from Birth: 0%           Maternal Assessment:  Maternal Data:  Information for the patient's mother:  Alexandra Elmore [[id number]]   33 y.o.  /Para:   Information for the patient's mother:  Alexandra Elmore [[id number]]     Information for the patient's mother:  Alexandra Elmore [[id number]]   37w2d    Prenatal Breastfeeding Education: Self Educations     Prior Breastfeeding Experience: 1st time breastfeeding with this baby     Breastfeeding Goal: Exclusively Breastfeed     Breast Assessment  Right Breast: WDL  Right Nipple: Everts well , Small, and Short  Right Areola: WDL   Right Nipple Comfort: comfortable   Right Nipple Integrity: Intact    Left Breast: WDL  Left Nipple: Everts well , Small, and Short  Left Areola: WDL   Left Nipple Comfort: comfortable   Left Nipple Integrity: Intact    Medications of Concern: MOB reports that she uses topical Tretinoin. Did not use during pregnancy and asking if ok to restart if needed. Reviewed information for medication and L3 medication. Medication is ok for topical use however should not be taken orally., Medications Acceptable for breastfeeding , and Educated on risks/benefits of continued medication use with breastfeeding     Maternal Toxicology:   Information for the patient's mother:  Alexandra Elmore [[id number]]     Barbiturate Screen, Ur   Date Value Ref Range Status   2024 Neg Negative <200 ng/mL Final     Benzodiazepine Screen, Urine   Date Value Ref Range Status   2024 Neg Negative <200  Expected intake and output , Feeding and diaper log , Skin to skin , Engorgement prevention & management , Electric breast pump , Milk storage guidelines , Hand expression , [facility] , and [facility]     Action/Plan:  Breastfeed on cue and at least 8 times/24 hours, unlimited timing. May not nurse this often in the first 24 hours. Wake baby and offer breastfeeding if it has been 3 hours since the beginning of last feeding. Place infant skin to skin if infant will not breastfeed at 3 hours.   Hand express prior to latch to simi nipple and entice infant to the breast.   It is important to use gentle stimulation during feeding to promote active eating. Offer both breasts at every feeding. Burp infant in between sides. Alternate which breast is used first.   Offer STS often while awake. Mother holding infant skin to skin between feedings will promote milk supply and allow infant to rest more deeply.   Maintain a feeding log until infant is gaining weight and seen by primary care physician.   Request breastfeeding assistance from LC or RN as needed.     Feeding Plan reviewed with: Parents     Response:   Verbalized understanding of education and instruction

## 2024-08-24 NOTE — FLOWSHEET NOTE
08/23/24 2247 08/23/24 2301   Cervical Exam   Dilation (cm) 10  --    Dilation Complete Date 08/23/24  --    Dilation Complete Time 2247  --    Effacement 100  --    Station +1  --    Station (Labor Curve Graph) 4  --    OB Examiner Jesika DOMÍNGUEZ  --    Pushing   Pushing  --  Effective   Pushing Start Date  --  08/23/24   Pushing Start Time  --  2303     This RN and MD at bedside pushing with pt cont. Monitoring FHR strip until delivery

## 2024-08-24 NOTE — L&D DELIVERY NOTE
[facility] Obstetrics and Gynecology  Spontaneous Vaginal Delivery Note        Pre-operative Diagnosis:    Alexandra Elmore is a 34 y.o.  at 37w2d   O pos / RI / GBS neg  GHTN   Varicose veins     Post-operative Diagnosis:   Alexandra Elmore is a 34 y.o.  at 37w2d   O pos / RI / GBS neg  GHTN   Varicose veins             Anesthesia:  Epidural    Assistant: Jorge Trimble PGY2    Procedure:  Normal spontaneous vaginal delivery     Admission and Delivery:       Patient presented to [facility] Labor and Delivery for scheduled induction of labor for GHTN.  Underwent cervical ripening with welsh balloon.  Upon expulsion pitocin was increased.      Patient progressed spontaneously to complete cervical dilation.  Began pushing and with good maternal effort.  Category 2 tracing throughout, however with moderate variability and rapid return to baseline.  A viable female infant was delivered in the LAURYN position over intact perineum. Nuchal cord was not present. The shoulders and body were quickly to follow with maternal efforts. Infant was delivered with good tone and spontaneous cry without complication. Delayed cord clamping was performed.  Cord segment and cord blood were obtained.  Cord gasses were collected, however due to fetal wellbeing were discarded.  APGARS were noted to be 8 and 9. Delivery Date and Time: 2024 at 2340.      The placenta was delivered spontaneously with manual massage of the uterus and was noted to be intact with a 3 vessel cord. Pitocin was started immediately after placenta delivery. Lower uterine segment was noted to be free of clot and debris.  Bleeding noted to be appropriate.     Second degree perineal laceration was noted and repaired with 2-0 Vicryl in the usual fashion.     The patient tolerated well and is in stable condition following delivery.      EBL: 100 ml     Infant Wt: Pending     APGARS: 8, 9      Specimen:  Placenta / Cord segment / Cord blood

## 2024-08-24 NOTE — ANESTHESIA POSTPROCEDURE EVALUATION
Department of Anesthesiology  Postprocedure Note    Patient: Alexandra Elmore  MRN: [id number]  YOB: 1990  Date of evaluation: 8/24/2024    Procedure Summary       Date: 08/23/24 Room / Location:     Anesthesia Start: 1501 Anesthesia Stop: 2340    Procedure: Labor Analgesia Diagnosis:     Scheduled Providers:  Responsible Provider: All Arnold MD    Anesthesia Type: epidural ASA Status: 2            Anesthesia Type: No value filed.    Bart Phase I:      Bart Phase II:      Anesthesia Post Evaluation    Patient location during evaluation: PACU  Level of consciousness: awake  Airway patency: patent  Nausea & Vomiting: no vomiting  Cardiovascular status: blood pressure returned to baseline  Respiratory status: acceptable  Hydration status: stable  Pain management: adequate    No notable events documented.

## 2024-08-24 NOTE — PROGRESS NOTES
Department of Obstetrics and Gynecology  Labor and Delivery  Attending Post Partum Progress Note      SUBJECTIVE:   35 y/o  female S/P uncomplicated Vaginal Delivery, PPD #1, on 24.   The pregnancy was complicated by gestational HTN, nausea and varicose veins.  No current complaints are noted including headache, change in vision, fever, chills, chest pain, shortness of breath, nausea, vomiting, diarrhea or constipation. The patient denies any urinary complaints or calf tenderness.  Lochia is described as mild. The patient plans to breastfeed.      OBJECTIVE:      Vitals:  /84   Pulse 73   Temp 97.5 °F (36.4 °C) (Oral)   Resp 16   Ht 1.702 m (5' 7\")   Wt 93.4 kg (206 lb)   LMP 2023 (Exact Date)   SpO2 98%   Breastfeeding Unknown   BMI 32.26 kg/m²     CONSTITUTIONAL:  awake, alert, cooperative, no apparent distress, and appears stated age  LUNGS:  No increased work of breathing, good air exchange, clear to auscultation bilaterally, no crackles or wheezing  CARDIOVASCULAR:  normal S1 and S2  ABDOMEN:  soft, non-distended, and non-tender  MUSCULOSKELETAL:  full range of motion noted  NEUROLOGIC:  Mental Status Exam:  Level of Alertness:   awake  Orientation:   person, place, time  Memory:   normal  Fund of Knowledge:  normal  Attention/Concentration:  normal  Language:  normal  SKIN:  normal skin color, texture, turgor    DATA:      ntains abnormal data Comprehensive Metabolic Panel  Order: [id number]  Status: Final result       Visible to patient: Yes (not seen)       Next appt: None    0 Result Notes           Component  Ref Range & Units 24 0603 24 1958 24 1542 24 1624 7/15/24 0756 24 0914   Sodium  136 - 145 mmol/L 134 Low  135 Low  132 Low  136 139 137   Potassium  3.5 - 5.1 mmol/L 3.9 3.7 3.9 4.2 4.2 4.0   Chloride  99 - 110 mmol/L 104 104 99 102 104 103   CO2  21 - 32 mmol/L 20 Low  19 Low  19 Low  21 21 22   Anion Gap  3 - 16 10 12 14 13 14 12

## 2024-08-25 VITALS
DIASTOLIC BLOOD PRESSURE: 88 MMHG | TEMPERATURE: 98.2 F | RESPIRATION RATE: 18 BRPM | HEIGHT: 67 IN | WEIGHT: 206 LBS | SYSTOLIC BLOOD PRESSURE: 134 MMHG | OXYGEN SATURATION: 99 % | HEART RATE: 67 BPM | BODY MASS INDEX: 32.33 KG/M2

## 2024-08-25 PROCEDURE — 99024 POSTOP FOLLOW-UP VISIT: CPT | Performed by: OBSTETRICS & GYNECOLOGY

## 2024-08-25 PROCEDURE — 6370000000 HC RX 637 (ALT 250 FOR IP): Performed by: OBSTETRICS & GYNECOLOGY

## 2024-08-25 RX ORDER — IBUPROFEN 800 MG/1
800 TABLET, FILM COATED ORAL EVERY 8 HOURS PRN
Qty: 40 TABLET | Refills: 3 | Status: SHIPPED | OUTPATIENT
Start: 2024-08-25 | End: 2024-10-17

## 2024-08-25 RX ORDER — PSEUDOEPHEDRINE HCL 30 MG
100 TABLET ORAL 2 TIMES DAILY PRN
Qty: 30 CAPSULE | Refills: 0 | Status: SHIPPED | OUTPATIENT
Start: 2024-08-25

## 2024-08-25 RX ADMIN — ACETAMINOPHEN 1000 MG: 500 TABLET ORAL at 06:28

## 2024-08-25 RX ADMIN — DOCUSATE SODIUM 100 MG: 100 CAPSULE, LIQUID FILLED ORAL at 08:33

## 2024-08-25 RX ADMIN — IBUPROFEN 800 MG: 800 TABLET, FILM COATED ORAL at 00:11

## 2024-08-25 RX ADMIN — IBUPROFEN 800 MG: 800 TABLET, FILM COATED ORAL at 08:33

## 2024-08-25 ASSESSMENT — PAIN DESCRIPTION - LOCATION
LOCATION: PERINEUM;BACK
LOCATION: BACK;PERINEUM
LOCATION: PERINEUM

## 2024-08-25 ASSESSMENT — PAIN DESCRIPTION - DESCRIPTORS
DESCRIPTORS: ACHING
DESCRIPTORS: DULL;DISCOMFORT;SORE
DESCRIPTORS: DISCOMFORT;DULL;SORE

## 2024-08-25 ASSESSMENT — PAIN SCALES - GENERAL
PAINLEVEL_OUTOF10: 2
PAINLEVEL_OUTOF10: 2
PAINLEVEL_OUTOF10: 1

## 2024-08-25 ASSESSMENT — PAIN - FUNCTIONAL ASSESSMENT
PAIN_FUNCTIONAL_ASSESSMENT: ACTIVITIES ARE NOT PREVENTED

## 2024-08-25 NOTE — FLOWSHEET NOTE
Postpartum teaching completed and copy of AVS given to patient. Patient verbalized understanding of all teaching points.  All questions answered.Patient plans to follow-up with OB Provider as instructed in 1-2 weeks. Patient discharged in wheelchair in stable condition accompanied by family/guardian.

## 2024-08-25 NOTE — FLOWSHEET NOTE
Discharge Phone Call    Patient Name: Alexandra Elmore     OB Care Provider: Estelita Montenegro DO Discharge Date: 2024    Disposition of baby:    Phone Number: [phone] (home)     Attempts to Contact:  Date:    Caller  Date:    Caller  Date:    Caller    Information for the patient's :  Pete Elmore [[id number]]   Delivery Method: Vaginal, Spontaneous    1.  Now that you are at home is your pain being well controlled?   Y/N   If no, instruct to call       provider.      2. Are you breastfeeding?    Y/N    Do you need any extra support from our lactation staff?      Y/N    If yes, provide number for lactation.  3. Have you made or already had your first appointment with the baby's doctor? Y/N   If no, do      you know when to schedule it?  Y/N    4. Have you scheduled your follow-up appointment?  Y/N  If no, do you know when to schedule       it?    Y/N   If no, they can find it on printed discharge instructions.  5. Did staff discuss safe sleep during your stay? Y/N   6. Did we explain things in a way you could understand?  Y/N  7. Were we respectful of your preferences for labor and birth and include you in the plan of       care?  Y/N  If no, please explain _______________________________________________  8. Is there anyone in particular you would like to mention who provided care for you? _______      _________________________________________________________________________     9. Were you given a Post-Birth Warning Signs handout?  Y/N  Do you have it somewhere      easily accessible?  Y/N  If no, please send them a copy and ask them to put it somewhere      easily found.  10. Have you been crying excessively, having anger or mood swings that feel out of control, or       feel like you can't cope with caring for yourself or baby? Y/N   If yes, they may be showing       signs of postpartum depression and should call provider. There is also a        depression test on page C5 in

## 2024-08-25 NOTE — PROGRESS NOTES
Department of Obstetrics and Gynecology  Labor and Delivery  Attending Post Partum Progress Note        SUBJECTIVE:   35 y/o  female S/P uncomplicated Vaginal Delivery, PPD #2, on 24.   The pregnancy was complicated by gestational HTN, nausea and varicose veins.  No current complaints are noted including headache, change in vision, fever, chills, chest pain, shortness of breath, nausea, vomiting, diarrhea or constipation. The patient denies any urinary complaints or calf tenderness.  Lochia is described as mild. The patient plans to breastfeed.        OBJECTIVE:       Vitals:  Vitals:    24 1020 24 1406 24 2031 24 0010   BP: 134/87 136/84 129/81 137/86   Pulse: 71 73 73 66   Resp: 16 16 16 16   Temp: 97.7 °F (36.5 °C) 97.5 °F (36.4 °C) 98 °F (36.7 °C) 98.1 °F (36.7 °C)   TempSrc: Oral Oral Oral Oral   SpO2: 98%  98% 96%   Weight:       Height:            CONSTITUTIONAL:  awake, alert, cooperative, no apparent distress, and appears stated age  LUNGS:  No increased work of breathing, good air exchange, clear to auscultation bilaterally, no crackles or wheezing  CARDIOVASCULAR:  normal S1 and S2  ABDOMEN:  soft, non-distended, and non-tender  MUSCULOSKELETAL:  full range of motion noted  NEUROLOGIC:  Mental Status Exam:  Level of Alertness:   awake  Orientation:   person, place, time  Memory:   normal  Fund of Knowledge:  normal  Attention/Concentration:  normal  Language:  normal  SKIN:  normal skin color, texture, turgor     DATA:       ntains abnormal data Comprehensive Metabolic Panel  Order: [id number]  Status: Final result       Visible to patient: Yes (not seen)       Next appt: None    0 Result Notes                  Component  Ref Range & Units 24 0603 24 1958 24 1542 24 1624 7/15/24 0756 24 0914   Sodium  136 - 145 mmol/L 134 Low  135 Low  132 Low  136 139 137   Potassium  3.5 - 5.1 mmol/L 3.9 3.7 3.9 4.2 4.2 4.0   Chloride  99 - 110 mmol/L 104 104  99 102 104 103   CO2  21 - 32 mmol/L 20 Low  19 Low  19 Low  21 21 22   Anion Gap  3 - 16 10 12 14 13 14 12   Glucose  70 - 99 mg/dL 79 104 High  81 81 83 84   BUN  7 - 20 mg/dL 9 11 7 12 10 9   Creatinine  0.6 - 1.1 mg/dL 0.6 0.6 <0.5 Low  0.6 0.5 Low  <0.5 Low    Est, Glom Filt Rate  >60 >90 >90 CM >90 CM >90 CM >90 CM >90 CM   Comment: Pediatric calculator link  https://www.kidney.org/professionals/kdoqi/gfr_calculatorped  Effective Oct 3, 2022  These results are not intended for use in patients  <18 years of age.  eGFR results are calculated without  a race factor using the 2021 CKD-EPI equation.  Careful  clinical correlation is recommended, particularly when  comparing to results calculated using previous equations.  The CKD-EPI equation is less accurate in patients with  extremes of muscle mass, extra-renal metabolism of  creatinine, excessive creatinine ingestion, or following  therapy that affects renal tubular secretion.   Calcium  8.3 - 10.6 mg/dL 9.0 9.1 9.4 9.6 9.0 9.0   Total Protein  6.4 - 8.2 g/dL 5.8 Low  6.5 6.9 6.5 5.9 Low  6.5   Albumin  3.4 - 5.0 g/dL 3.0 Low  3.7 3.8 3.8 3.6 4.0   Albumin/Globulin Ratio  1.1 - 2.2 1.1 1.3 1.2 1.4 1.6 1.6   Total Bilirubin  0.0 - 1.0 mg/dL 0.5 <0.2 <0.2 <0.2 <0.2 <0.2   Alkaline Phosphatase  40 - 129 U/L 98 111 101 100 81 65   ALT  10 - 40 U/L 12 14 15 17 18 26   AST  15 - 37 U/L 25 15 16 17 14 Low  18   Resulting Agency [facility]                Specimen Collected: 08/24/24 06:03 EDT Last Resulted: 08/24/24 07:10 EDT        Lab Flowsheet        Order Details        View Encounter        Lab and Collection Details        Routing        Result History     View All Conversations on this Encounter        ntains abnormal data CBC  Order: [id number]  Status: Final result       Visible to patient: Yes (not seen)       Next appt:

## 2024-08-25 NOTE — FLOWSHEET NOTE
Mother given prescriptions for motrin and colace. Mother instructed on proper use and side effects. Mother verbalized understanding.

## 2024-08-25 NOTE — LACTATION NOTE
This note was copied from a baby's chart.  LACTATION CONSULTATION      Follow-up Consult: Reason for Follow-up: assess needs , provide education, and sore nipples and/or nipple damage      Name: Pete Elmore       MRN: [id number]               YOB: 2023   Time of Birth: 11:40 PM   Gestational age: Gestational Age: 37w2d   Birth Weight: Birth Weight: 2.938 kg (6 lb 7.6 oz) Most Recent Weight: Weight: 2.856 kg (6 lb 4.7 oz)   Weight Change from Birth: -3%            Maternal Assessment:      Maternal Data:   Information for the patient's mother:  Alexandra Elmore [[id number]]   33 y.o.   /Para:   Information for the patient's mother:  Alexandra Elmore [[id number]]       Information for the patient's mother:  Alexandra Elmore [[id number]]   37w2d         Breast Assessment  Right Breast: Breasts not assessed this encounter   Right Nipple Comfort: sore maternal report       Left Breast: Breasts not assessed this encounter   Left Nipple Comfort: sore maternal report        Infant Assessment:      DOL:32 hours       Feeding: Breastfeeding      Nipple Shield in Use: No    I&O adequacy:  Urine output: is established  Stool output: is established  Percent weight change from birthweight: -3%     Oral Assessment:   Oral assessment not completed at this time.     Birth Factors/Diagnosis that could create risk for breastfeeding:     Glucose: No     Intervention during consultation:     Interventions Performed:   Hydrogel pads and Education     Latch & Positioning: MOB reports infant has been feeding well and a lot overnight. Encouraged to call for assist. Name and number on white board.     Manual Expression:  MOB states she understands     Bedside Breast Pump:   N/A    Breast Shield Size:   N/A    Amount of milk expressed:   N/A    Pump Arrangements:  Owns breast pump    Pump Distributed: No     Education:  Latch & positioning , Feeding frequency & feeding cues , Discharge

## 2024-08-25 NOTE — DISCHARGE INSTR - DIET

## 2024-08-25 NOTE — DISCHARGE INSTRUCTIONS
Call for increased pain, significant vaginal bleeding or temperature greater than 101 degrees F.  Follow up 2 weeks.     Thank you for the opportunity to care for you and your family.        We hope that you are happy with the care we provided during your stay in the [facility]. We want to ensure that you have the help you need when you leave the hospital. If there is anything we can assist you with, please let us know.      Call 911 if you have:    Chest pain or pressure  Shortness of breath, even at rest  Thoughts of harming yourself or your baby  Seizures    Call your healthcare provider if you have:    Temperature of 100.4 degrees or higher  Stitches that are not healing        -- Swelling, bleeding, drainage, foul odor, redness or warmth in/around your           stitches, staples, or incision (scar)        -- Bad smelling blood or discharge from the vagina  Vaginal bleeding that has increased         -- Soaking through one pad in an hour        -- You are passing clots larger than the size of a lemon  Red, warm tender area(s) in your breast or calf  Headache that does not get better, even after taking medicine; or headache with vision changes    Remember to notify all healthcare providers from your date of delivery to up to one year after giving birth!    CARING FOR YOURSELF        DIET/ACTIVITY    Eat a well balanced diet focusing on foods high in fiber and protein.  Drink plenty of fluids, especially water.  To avoid constipation you may take a mild stool softener as recommended by your doctor or midwife.  Gradually increase your activity. Resume an exercise regime only after being advised by your doctor or midwife.  When sitting or lying down, keep your legs elevated to reduce swelling.  Avoid lifting anything heavier than a gallon of milk.  Avoid driving for two weeks or while taking narcotics.  No sexual intercourse for 6 weeks, or until advised by your OB provider. Nothing in the vagina:

## 2024-08-25 NOTE — DISCHARGE SUMMARY
[facility]          DISCHARGE SUMMARY      Primary Diagnosis: intrauterine pregnancy at 37 weeks 2 days gestation  Secondary Diagnosis: gestational hypertension, nausea  Procedures: normal spontaneous vaginal delivery on 24, repair of 2nd degree perineal laceration  Referrals: lactation consultant  Significant Findings: viable female   Reason for Hospitalization: induction of labor  Hospital Course and Complications: none  Corky Elmore--/father of baby was present throughout for labor, delivery and postpartum care      Discharge Instructions:    Diet:common adult  Activity:activity as tolerated, no heavy lifting or driving for 2 weeks, pelvic rest x 6-8 weeks  Medications: ibuprofen, colace, resume home medications  Disposition: Home  Condition on discharge: Stable  Follow up: in 1-2 week(s)      Domi Giordano D.O.   [facility]

## 2024-08-25 NOTE — PLAN OF CARE
Problem: Vaginal Birth or  Section  Goal: Fetal and maternal status remain reassuring during the birth process  Description:  Birth OB-Pregnancy care plan goal which identifies if the fetal and maternal status remain reassuring during the birth process  Outcome: Adequate for Discharge     Problem: Postpartum  Goal: Experiences normal postpartum course  Description:  Postpartum OB-Pregnancy care plan goal which identifies if the mother is experiencing a normal postpartum course  Outcome: Adequate for Discharge  Goal: Appropriate maternal -  bonding  Description:  Postpartum OB-Pregnancy care plan goal which identifies if the mother and  are bonding appropriately  Outcome: Adequate for Discharge  Goal: Establishment of infant feeding pattern  Description:  Postpartum OB-Pregnancy care plan goal which identifies if the mother is establishing a feeding pattern with their   Outcome: Adequate for Discharge  Goal: Incisions, wounds, or drain sites healing without S/S of infection  Outcome: Adequate for Discharge  Flowsheets (Taken 2024)  Incisions, Wounds, or Drain Sites Healing Without Sign and Symptoms of Infection:   ADMISSION and DAILY: Assess and document risk factors for pressure ulcer development   TWICE DAILY: Assess and document skin integrity   TWICE DAILY: Assess and document dressing/incision, wound bed, drain sites and surrounding tissue     Problem: Pain  Goal: Verbalizes/displays adequate comfort level or baseline comfort level  Outcome: Adequate for Discharge  Flowsheets (Taken 2024)  Verbalizes/displays adequate comfort level or baseline comfort level:   Encourage patient to monitor pain and request assistance   Assess pain using appropriate pain scale   Administer analgesics based on type and severity of pain and evaluate response   Implement non-pharmacological measures as appropriate and evaluate response     Problem: Infection - Adult  Goal: Absence of

## 2024-08-27 ENCOUNTER — LACTATION ENCOUNTER (OUTPATIENT)
Dept: INPATIENT UNIT | Age: 34
End: 2024-08-27

## 2024-08-27 NOTE — LACTATION NOTE
This note was copied from a baby's chart.  LACTATION CONSULT FOR BREASTFEEDING IN SCN  Infant Assessment    DOL: 4 days Corrected Gestational Age: 37w 6d    Vital Signs:  Heart Rate Status: stable   O2 Status: Room Air   Respiration Status: stable       Maternal Assessment    Pumpin-8x/24 hours     Supply:  12-14 ml    Breastfeeding Assessment: Infant active alert when placed at breast for feeding. LC at bedside to provide some coaching regarding latching infant to breast, and achieving deep latch. AMADOU was achieved using football hold at right breast. MOB able to independently latch infant with strong coordinated suck burst present, with audible swallows noted. MOB confirms latch is comfortable with strong suck present. RN updated on infant feeding, and latch at breast.    Education: Importance of pumping every 3 hours for 15 minutes, at least 8x per day , Breastfeed infant as often as tolerated in SCN , Importance of maternal comfort and infant alignment to the breast , and discussed pumping to be continues with infant supplementation. Will discuss weaning from pumping when infant no longer receiving supplement.    Recommend: Breastfeed infant in SCN as ordered/tolerated , Pump every 3 hours for 15 minutes, at least 8x per day, Use gentle massage during pump session, Use ice/cool compress to reduce swelling and provide comfort between pumping/breastfeeding, Wash breast pump parts after each use, Obtain additional supplies while in SCN or ask RN for assistance, and Request lactation assistance with any questions, concerns or needs.

## 2024-08-27 NOTE — LACTATION NOTE
This note was copied from a baby's chart.  SPECIAL CARE NURSERY LACTATION CONSULTATION    Initial Lactation Consult for Mother with Infant in SCN    Infant readmitted today for phototherapy.  MOB called yesterday into lactation support, having difficulty with infant latching to breast, and was scheduled for outpatient visit after her peds appointment today.   MOB reports that infant was sleepy at the breast overnight with feedings, and seem fussy in general. Output decreased. Today her breast are engorged, and nipples are damaged.    YOB: 2023   Time of Birth: 11:40 PM   Gestational age: Gestational Age: 37w2d  Birth Weight:    Birth Weight: 2.938 kg (6 lb 7.6 oz) Most Recent Weight: Weight: 2.64 kg (5 lb 13.1 oz)   Weight Change from Birth: -10%             Maternal Assessment:  Maternal Data:  Information for the patient's mother:  Alexandra Elmore [[id number]]   33 y.o.  /Para:   Information for the patient's mother:  Alexandra Elmore [[id number]]     Information for the patient's mother:  Alexandra Elmore [[id number]]   37w2d  Prenatal Breastfeeding Education: Self Educations   Prior Breastfeeding Experience: 1st time breastfeeding with this baby   Breastfeeding Goal: Exclusively Breastfeed       Breast Assessment  Right Breast:  moderate engorged   Right Nipple: Everts well , Small, and Short  Right Areola: Edema  and WDL   Right Nipple Comfort: sore and mob reports pain with latch  Right Nipple Integrity: Cracked  and Red     Left Breast:  moderate engorged   Left Nipple: Everts well , Small, and Short  Left Areola: Edema  and WDL   Left Nipple Comfort: sore and mob reports pain with feedings  Left Nipple Integrity: Cracked , Red , and Sore      Medications of Concern:      Maternal Toxicology:   Information for the patient's mother:  Alexandra Elmore [[id number]]     Barbiturate Screen, Ur   Date Value Ref Range Status   2024 Neg Negative <200 ng/mL Final

## 2024-08-27 NOTE — LACTATION NOTE
This note was copied from a baby's chart.  LACTATION CONSULT FOR BREASTFEEDING IN [facility]  Infant Assessment    DOL: 4 days Corrected Gestational Age: 37w 6d    Vital Signs:  Heart Rate Status: stable   O2 Status: Room Air   Respiration Status: stable       Maternal Assessment    Pumping: MOB was given instruction on pumping q3 hours after infant goes to breast x15 minutes using symphony breast pump with 19mm flange.  Breast are engorged- Care Plan initiated.    Supply:  15 ml last pump session    Breastfeeding Assessment: Assisted mob with feeding at breast. Reviewed how to place infant to left breast using cross cradle hold. Hand pump given to mob to help start milk flow, and soften and simi nipple. Infant then placed at left breast to latch. MOB needing some hands on assistance to bring infant onto breast and achieve deep latch. Infant with few suck burst noted, then slipping off nipple. MOB breast are full. Few more attempts made, offering some EBM at nipple. Infant continues to pull back crying. Nipple shield used to aide in latching to engorged breast. MOB was shown how to apply correctly. Infant then placed back to breast with EBM placed under shied. Infant with junito noted after few attempts, with srs observed with several audible swallows noted at the breast over the next 20 minutes. Infant falling asleep with nipple in mouth. Infant then brought back off breast, burped and offered right breast. Infant remains sleepy with no latch achieved on right. Much praise and support given to mob. RN updated on infant feeding.    Education: Importance of pumping every 3 hours for 15 minutes, at least 8x per day , Breastfeed infant as often as tolerated in SCN , Importance of maternal comfort and infant alignment to the breast , and Proper application and use of nipple shield -wean from shield as infant tolerates.    Recommend: Breastfeed infant in SCN as ordered/tolerated , Pump every 3 hours for 15 minutes, at least 8x

## 2024-08-28 ENCOUNTER — LACTATION ENCOUNTER (OUTPATIENT)
Dept: INPATIENT UNIT | Age: 34
End: 2024-08-28

## 2024-08-28 NOTE — LACTATION NOTE
This note was copied from a baby's chart.  LACTATION CONSULT FOR BREASTFEEDING IN [facility]  Infant Assessment    DOL: 5 days Corrected Gestational Age: 38w 0d    Vital Signs:  Heart Rate Status: stable   O2 Status: Room Air   Respiration Status: stable       Maternal Assessment    Pumpinx or more /24 hours     Supply:  20-30 mls    Breastfeeding Assessment: Infant placed STS with mother at the breast. Mother holding infant far from the breast in belly to belly, mouth to nipple position with hand supporting infant at the back of the head. Gave tips to improve positioning, and support of infant to aide in obtaining a deeper latch onto the breast. Additonal pillow provided and placed infant STS, showing belly to belly, nose to nipple position and explained rationale. Shown how to hold infant at the neck and shoulders vs that back of the head and explained how this helps support a deep latch onto the breast. Infant fussy at the breast with attempts to latch. Attempted to hand express colostrum and calming techniques. Shield applied and attempted to latch with shield but infant remained fussy. Few mL's of mother's EBM fed to infant close the breast to calm, then shield filled with mother's EBM using syringe, and assisted baby to latch onto the breast. AMADOU achieved with milk in shield, infant with SRS and much AS consistent with mature milk transfer during good 30 minute feeding. Shown mother how to stimulate infant to suck as needed if infant becoming drowsy at the breast. Spoke with Dr. Trevino regarding feeding plan for d/c home and notified of good feeding and swallowing heard during feeding, infant with 2 saturated wet diapers one before and one after feeding, and mother's breast feeling softer after breastfeeding. Dr. Trevino at bedside to discuss feeding plan with family. Recommended to continue to breastfeed ad taiwo with hunger cues, and every 3 hours, offering 15 mL supplement of mother's EBM q3h after breastfeeding,

## 2024-09-03 ENCOUNTER — POSTPARTUM VISIT (OUTPATIENT)
Dept: OBGYN CLINIC | Age: 34
End: 2024-09-03
Payer: COMMERCIAL

## 2024-09-03 VITALS
HEART RATE: 65 BPM | SYSTOLIC BLOOD PRESSURE: 152 MMHG | DIASTOLIC BLOOD PRESSURE: 90 MMHG | TEMPERATURE: 98 F | BODY MASS INDEX: 29.26 KG/M2 | WEIGHT: 186.8 LBS

## 2024-09-03 DIAGNOSIS — O13.3 GESTATIONAL HYPERTENSION, THIRD TRIMESTER: ICD-10-CM

## 2024-09-03 PROCEDURE — 0503F POSTPARTUM CARE VISIT: CPT | Performed by: OBSTETRICS & GYNECOLOGY

## 2024-09-03 PROCEDURE — 36415 COLL VENOUS BLD VENIPUNCTURE: CPT | Performed by: OBSTETRICS & GYNECOLOGY

## 2024-09-04 LAB
ALBUMIN SERPL-MCNC: 4.2 G/DL (ref 3.4–5)
ALBUMIN/GLOB SERPL: 1.6 {RATIO} (ref 1.1–2.2)
ALP SERPL-CCNC: 96 U/L (ref 40–129)
ALT SERPL-CCNC: 22 U/L (ref 10–40)
ANION GAP SERPL CALCULATED.3IONS-SCNC: 13 MMOL/L (ref 3–16)
AST SERPL-CCNC: 15 U/L (ref 15–37)
BASOPHILS # BLD: 0.1 K/UL (ref 0–0.2)
BASOPHILS NFR BLD: 1.1 %
BILIRUB SERPL-MCNC: 0.3 MG/DL (ref 0–1)
BUN SERPL-MCNC: 13 MG/DL (ref 7–20)
CALCIUM SERPL-MCNC: 9.8 MG/DL (ref 8.3–10.6)
CHLORIDE SERPL-SCNC: 102 MMOL/L (ref 99–110)
CO2 SERPL-SCNC: 23 MMOL/L (ref 21–32)
CREAT SERPL-MCNC: 0.8 MG/DL (ref 0.6–1.1)
CREAT UR-MCNC: 67.1 MG/DL (ref 28–259)
DEPRECATED RDW RBC AUTO: 13.1 % (ref 12.4–15.4)
EOSINOPHIL # BLD: 0.3 K/UL (ref 0–0.6)
EOSINOPHIL NFR BLD: 3.7 %
GFR SERPLBLD CREATININE-BSD FMLA CKD-EPI: >90 ML/MIN/{1.73_M2}
GLUCOSE SERPL-MCNC: 81 MG/DL (ref 70–99)
HCT VFR BLD AUTO: 38.6 % (ref 36–48)
HGB BLD-MCNC: 13.3 G/DL (ref 12–16)
LDH SERPL L TO P-CCNC: 234 U/L (ref 100–190)
LYMPHOCYTES # BLD: 2.3 K/UL (ref 1–5.1)
LYMPHOCYTES NFR BLD: 27.9 %
MCH RBC QN AUTO: 31.9 PG (ref 26–34)
MCHC RBC AUTO-ENTMCNC: 34.4 G/DL (ref 31–36)
MCV RBC AUTO: 92.8 FL (ref 80–100)
MONOCYTES # BLD: 0.6 K/UL (ref 0–1.3)
MONOCYTES NFR BLD: 7.3 %
NEUTROPHILS # BLD: 4.9 K/UL (ref 1.7–7.7)
NEUTROPHILS NFR BLD: 60 %
PLATELET # BLD AUTO: 422 K/UL (ref 135–450)
PMV BLD AUTO: 8.9 FL (ref 5–10.5)
POTASSIUM SERPL-SCNC: 4.9 MMOL/L (ref 3.5–5.1)
PROT SERPL-MCNC: 6.8 G/DL (ref 6.4–8.2)
PROT UR-MCNC: 9.85 MG/DL
PROT/CREAT UR-RTO: 0.1 MG/DL
RBC # BLD AUTO: 4.16 M/UL (ref 4–5.2)
SODIUM SERPL-SCNC: 138 MMOL/L (ref 136–145)
URATE SERPL-MCNC: 4.8 MG/DL (ref 2.6–6)
WBC # BLD AUTO: 8.2 K/UL (ref 4–11)

## 2024-09-04 RX ORDER — LABETALOL 100 MG/1
100 TABLET, FILM COATED ORAL EVERY 12 HOURS SCHEDULED
Status: SHIPPED | OUTPATIENT
Start: 2024-09-04

## 2024-09-30 ENCOUNTER — POSTPARTUM VISIT (OUTPATIENT)
Dept: OBGYN CLINIC | Age: 34
End: 2024-09-30

## 2024-09-30 VITALS
WEIGHT: 183.6 LBS | BODY MASS INDEX: 28.82 KG/M2 | HEART RATE: 78 BPM | DIASTOLIC BLOOD PRESSURE: 78 MMHG | HEIGHT: 67 IN | SYSTOLIC BLOOD PRESSURE: 136 MMHG

## 2024-09-30 DIAGNOSIS — O13.9 GESTATIONAL HYPERTENSION, ANTEPARTUM: ICD-10-CM

## 2024-09-30 PROCEDURE — 0503F POSTPARTUM CARE VISIT: CPT | Performed by: OBSTETRICS & GYNECOLOGY

## 2024-10-07 PROBLEM — Z3A.37 37 WEEKS GESTATION OF PREGNANCY: Status: RESOLVED | Noted: 2024-08-22 | Resolved: 2024-10-07

## 2024-10-07 PROBLEM — Z34.03 ENCOUNTER FOR PRENATAL CARE IN THIRD TRIMESTER OF FIRST PREGNANCY: Status: RESOLVED | Noted: 2024-02-21 | Resolved: 2024-10-07

## 2024-10-07 PROBLEM — O22.00 VARICOSE VEINS DURING PREGNANCY: Status: RESOLVED | Noted: 2024-05-15 | Resolved: 2024-10-07

## 2024-10-07 PROBLEM — O21.9 NAUSEA AND VOMITING DURING PREGNANCY: Status: RESOLVED | Noted: 2024-02-21 | Resolved: 2024-10-07

## 2024-10-07 PROBLEM — O13.3 GESTATIONAL HYPERTENSION W/O SIGNIFICANT PROTEINURIA IN 3RD TRIMESTER: Status: RESOLVED | Noted: 2024-08-23 | Resolved: 2024-10-07

## 2024-10-07 NOTE — PROGRESS NOTES
Postpartum Visit    CC:   Chief Complaint   Patient presents with    Postpartum Care       34 y.o.  s/p vaginal delivery on 2024 here for her postpartum visit:    HPI:    Patient is doing well today.  Postpartum course has been complicated by persistent hypertension.  Has been sometimes forgetting to take her Labetalol at home.  Reports BP remain variable 122/76 - 140/92.  Reports lochia has stopped.   Pain is well controlled.  Ambulating without difficulty, denies dizziness with standing.  Denies concerns with bowel or bladder function.  Denies headaches, vision changes, RUQ pain, increased LE edema.  Denies chest pain, shortness of breath, fever, chills, nausea, vomiting.      Reports moods have been doing well without concern.      Breast milk feeding.  Reports infant has been doing well without problems.      Patient is not sexually active.  She is undecided for contraception.      Review of Systems - The following ROS was otherwise negative, except as noted in the HPI: constitutional, respiratory, cardiovascular, gastrointestinal, genitourinary    OB History  OB History    Para Term  AB Living   1 1 1     1   SAB IAB Ectopic Molar Multiple Live Births           0 1      # Outcome Date GA Lbr Jorge/2nd Weight Sex Type Anes PTL Lv   1 Term 24 37w2d 02:57 / 00:53 2.938 kg (6 lb 7.6 oz) F Vag-Spont EPI N IRAJ      Complications: Gestational hypertension       Medications:  Prior to Admission medications    Medication Sig Start Date End Date Taking? Authorizing Provider   docusate sodium (COLACE, DULCOLAX) 100 MG CAPS Take 100 mg by mouth 2 times daily as needed for Constipation 24  Yes Domi Giordano DO   ibuprofen (ADVIL;MOTRIN) 800 MG tablet Take 1 tablet by mouth every 8 hours as needed for Pain 8/25/24 10/17/24 Yes Domi Giordano DO   Prenatal Vit-Fe Fumarate-FA (PRENATAL VITAMINS PO) Take by mouth   Yes Provider, MD Luis       Objective:  /78

## 2024-11-22 ENCOUNTER — PATIENT MESSAGE (OUTPATIENT)
Dept: OBGYN CLINIC | Age: 34
End: 2024-11-22

## 2025-03-20 ENCOUNTER — OFFICE VISIT (OUTPATIENT)
Dept: FAMILY MEDICINE CLINIC | Age: 35
End: 2025-03-20

## 2025-03-20 VITALS
OXYGEN SATURATION: 98 % | SYSTOLIC BLOOD PRESSURE: 133 MMHG | BODY MASS INDEX: 29.19 KG/M2 | WEIGHT: 186 LBS | TEMPERATURE: 97.7 F | HEIGHT: 67 IN | HEART RATE: 76 BPM | DIASTOLIC BLOOD PRESSURE: 90 MMHG

## 2025-03-20 DIAGNOSIS — N61.0 MASTITIS: Primary | ICD-10-CM

## 2025-03-20 RX ORDER — CEPHALEXIN 500 MG/1
500 CAPSULE ORAL 4 TIMES DAILY
Qty: 28 CAPSULE | Refills: 0 | Status: SHIPPED | OUTPATIENT
Start: 2025-03-20 | End: 2025-03-27

## 2025-03-20 ASSESSMENT — ENCOUNTER SYMPTOMS
RHINORRHEA: 0
COLOR CHANGE: 1
EYE PAIN: 0
WHEEZING: 0
BLOOD IN STOOL: 0
ABDOMINAL PAIN: 0
SORE THROAT: 0
CHEST TIGHTNESS: 0
SINUS PAIN: 0
STRIDOR: 0
EYE DISCHARGE: 0
CONSTIPATION: 0
EYE ITCHING: 0
VOICE CHANGE: 0
NAUSEA: 0
VOMITING: 0
BACK PAIN: 0
SHORTNESS OF BREATH: 0
PHOTOPHOBIA: 0
CHOKING: 0
COUGH: 0
DIARRHEA: 0
TROUBLE SWALLOWING: 0
SINUS PRESSURE: 0
EYE REDNESS: 0

## 2025-03-20 ASSESSMENT — PATIENT HEALTH QUESTIONNAIRE - PHQ9
1. LITTLE INTEREST OR PLEASURE IN DOING THINGS: NOT AT ALL
SUM OF ALL RESPONSES TO PHQ QUESTIONS 1-9: 0
SUM OF ALL RESPONSES TO PHQ QUESTIONS 1-9: 0
2. FEELING DOWN, DEPRESSED OR HOPELESS: NOT AT ALL
2. FEELING DOWN, DEPRESSED OR HOPELESS: NOT AT ALL
SUM OF ALL RESPONSES TO PHQ QUESTIONS 1-9: 0
SUM OF ALL RESPONSES TO PHQ9 QUESTIONS 1 & 2: 0
1. LITTLE INTEREST OR PLEASURE IN DOING THINGS: NOT AT ALL
SUM OF ALL RESPONSES TO PHQ QUESTIONS 1-9: 0

## 2025-03-20 NOTE — PROGRESS NOTES
Chief Complaint   Patient presents with    mastitis     Left breast, really painful       BP (!) 133/90   Pulse 76   Temp 97.7 °F (36.5 °C)   Ht 1.702 m (5' 7\")   Wt 84.4 kg (186 lb)   SpO2 98%   Breastfeeding Yes   BMI 29.13 kg/m²     HPI:  Alexandra Elmore is a 35 y.o. (: 1990) here today   for   HPI    Patient's medications, allergies, past medical, surgical, social and family histories were reviewed and updated as appropriate.    Mastitis: she has been breast feeding 7 months. And pumping. No fever , chills body aches, left breast,  nipple bleeding, no puss, decreased output, She has tried warm compress, shower, manual expression, pumping, nursing.     MRSA: no hx.     ROS:  Review of Systems   Constitutional:  Negative for activity change, appetite change, chills, diaphoresis, fatigue, fever and unexpected weight change.   HENT:  Negative for congestion, ear discharge, ear pain, hearing loss, nosebleeds, postnasal drip, rhinorrhea, sinus pressure, sinus pain, sneezing, sore throat, tinnitus, trouble swallowing and voice change.    Eyes:  Negative for photophobia, pain, discharge, redness and itching.   Respiratory:  Negative for cough, choking, chest tightness, shortness of breath, wheezing and stridor.    Cardiovascular:  Negative for chest pain, palpitations and leg swelling.   Gastrointestinal:  Negative for abdominal pain, blood in stool, constipation, diarrhea, nausea and vomiting.   Endocrine: Negative for cold intolerance, heat intolerance, polydipsia and polyuria.   Genitourinary:  Negative for difficulty urinating, dysuria, enuresis, flank pain, frequency, hematuria and urgency.   Musculoskeletal:  Negative for back pain, gait problem, joint swelling, neck pain and neck stiffness.   Skin:  Positive for color change. Negative for pallor, rash and wound.   Allergic/Immunologic: Negative for environmental allergies and food allergies.   Neurological:  Negative for dizziness, tremors,

## 2025-05-22 ENCOUNTER — OFFICE VISIT (OUTPATIENT)
Dept: FAMILY MEDICINE CLINIC | Age: 35
End: 2025-05-22

## 2025-05-22 VITALS
WEIGHT: 192 LBS | BODY MASS INDEX: 30.07 KG/M2 | DIASTOLIC BLOOD PRESSURE: 80 MMHG | SYSTOLIC BLOOD PRESSURE: 124 MMHG | OXYGEN SATURATION: 99 % | HEART RATE: 78 BPM

## 2025-05-22 DIAGNOSIS — B00.89 HERPETIC DERMATITIS: Primary | ICD-10-CM

## 2025-05-22 RX ORDER — METHYLPREDNISOLONE 4 MG/1
TABLET ORAL
Qty: 1 KIT | Refills: 0 | Status: SHIPPED | OUTPATIENT
Start: 2025-05-22 | End: 2025-05-28

## 2025-05-22 RX ORDER — VALACYCLOVIR HYDROCHLORIDE 1 G/1
1000 TABLET, FILM COATED ORAL 3 TIMES DAILY
Qty: 21 TABLET | Refills: 0 | Status: SHIPPED | OUTPATIENT
Start: 2025-05-22 | End: 2025-05-29

## 2025-05-22 ASSESSMENT — ENCOUNTER SYMPTOMS
COLOR CHANGE: 1
DIARRHEA: 0
BACK PAIN: 0
RHINORRHEA: 0
EYE DISCHARGE: 0
STRIDOR: 0
SHORTNESS OF BREATH: 0
SINUS PRESSURE: 0
EYE REDNESS: 0
SINUS PAIN: 0
EYE PAIN: 0
BLOOD IN STOOL: 0
COUGH: 0
CHOKING: 0
ABDOMINAL PAIN: 0
TROUBLE SWALLOWING: 0
VOMITING: 0
PHOTOPHOBIA: 0
CHEST TIGHTNESS: 0
CONSTIPATION: 0
VOICE CHANGE: 0
EYE ITCHING: 0
NAUSEA: 0
SORE THROAT: 0
WHEEZING: 0

## 2025-05-22 NOTE — PROGRESS NOTES
Chief Complaint   Patient presents with    Rash       /80 (BP Site: Left Upper Arm, Patient Position: Sitting, BP Cuff Size: Medium Adult)   Pulse 78   Wt 87.1 kg (192 lb)   SpO2 99%   BMI 30.07 kg/m²     HPI:  Alexandra Elmore is a 35 y.o. (: 1990) here today   for   Rash  Pertinent negatives include no congestion, cough, diarrhea, eye pain, fatigue, fever, rhinorrhea, shortness of breath, sore throat or vomiting.       Patient's medications, allergies, past medical, surgical, social and family histories were reviewed and updated as appropriate.    Rash: noticing more, itchy, not painful, started a few days ago, did not Feel it first, feels like it is spreading.  9 month baby, not breast feeding, still pumping some.     Herpetic looking, itchy,       ROS:  Review of Systems   Constitutional:  Negative for activity change, appetite change, chills, diaphoresis, fatigue, fever and unexpected weight change.   HENT:  Negative for congestion, ear discharge, ear pain, hearing loss, nosebleeds, postnasal drip, rhinorrhea, sinus pressure, sinus pain, sneezing, sore throat, tinnitus, trouble swallowing and voice change.    Eyes:  Negative for photophobia, pain, discharge, redness and itching.   Respiratory:  Negative for cough, choking, chest tightness, shortness of breath, wheezing and stridor.    Cardiovascular:  Negative for chest pain, palpitations and leg swelling.   Gastrointestinal:  Negative for abdominal pain, blood in stool, constipation, diarrhea, nausea and vomiting.   Endocrine: Negative for cold intolerance, heat intolerance, polydipsia and polyuria.   Genitourinary:  Negative for difficulty urinating, dysuria, enuresis, flank pain, frequency, hematuria and urgency.   Musculoskeletal:  Negative for back pain, gait problem, joint swelling, neck pain and neck stiffness.   Skin:  Positive for color change and rash. Negative for pallor and wound.   Allergic/Immunologic: Negative for

## 2025-06-12 ENCOUNTER — OFFICE VISIT (OUTPATIENT)
Dept: FAMILY MEDICINE CLINIC | Age: 35
End: 2025-06-12

## 2025-06-12 ENCOUNTER — OFFICE VISIT (OUTPATIENT)
Dept: OBGYN CLINIC | Age: 35
End: 2025-06-12
Payer: COMMERCIAL

## 2025-06-12 VITALS
HEIGHT: 67 IN | SYSTOLIC BLOOD PRESSURE: 122 MMHG | OXYGEN SATURATION: 100 % | BODY MASS INDEX: 30.13 KG/M2 | HEART RATE: 73 BPM | DIASTOLIC BLOOD PRESSURE: 88 MMHG | WEIGHT: 192 LBS

## 2025-06-12 VITALS — DIASTOLIC BLOOD PRESSURE: 84 MMHG | HEART RATE: 105 BPM | OXYGEN SATURATION: 99 % | SYSTOLIC BLOOD PRESSURE: 152 MMHG

## 2025-06-12 DIAGNOSIS — R03.0 ELEVATED BLOOD PRESSURE READING: ICD-10-CM

## 2025-06-12 DIAGNOSIS — Z01.419 ENCNTR FOR GYN EXAM (GENERAL) (ROUTINE) W/O ABN FINDINGS: Primary | ICD-10-CM

## 2025-06-12 DIAGNOSIS — R03.0 ELEVATED BLOOD PRESSURE READING: Primary | ICD-10-CM

## 2025-06-12 DIAGNOSIS — Z13.220 SCREENING FOR LIPID DISORDERS: ICD-10-CM

## 2025-06-12 PROCEDURE — 99395 PREV VISIT EST AGE 18-39: CPT | Performed by: OBSTETRICS & GYNECOLOGY

## 2025-06-12 SDOH — ECONOMIC STABILITY: FOOD INSECURITY: WITHIN THE PAST 12 MONTHS, YOU WORRIED THAT YOUR FOOD WOULD RUN OUT BEFORE YOU GOT MONEY TO BUY MORE.: NEVER TRUE

## 2025-06-12 SDOH — ECONOMIC STABILITY: FOOD INSECURITY: WITHIN THE PAST 12 MONTHS, THE FOOD YOU BOUGHT JUST DIDN'T LAST AND YOU DIDN'T HAVE MONEY TO GET MORE.: NEVER TRUE

## 2025-06-12 ASSESSMENT — PATIENT HEALTH QUESTIONNAIRE - PHQ9
6. FEELING BAD ABOUT YOURSELF - OR THAT YOU ARE A FAILURE OR HAVE LET YOURSELF OR YOUR FAMILY DOWN: NOT AT ALL
5. POOR APPETITE OR OVEREATING: NOT AT ALL
SUM OF ALL RESPONSES TO PHQ QUESTIONS 1-9: 2
8. MOVING OR SPEAKING SO SLOWLY THAT OTHER PEOPLE COULD HAVE NOTICED. OR THE OPPOSITE, BEING SO FIGETY OR RESTLESS THAT YOU HAVE BEEN MOVING AROUND A LOT MORE THAN USUAL: NOT AT ALL
SUM OF ALL RESPONSES TO PHQ QUESTIONS 1-9: 2
3. TROUBLE FALLING OR STAYING ASLEEP: NOT AT ALL
10. IF YOU CHECKED OFF ANY PROBLEMS, HOW DIFFICULT HAVE THESE PROBLEMS MADE IT FOR YOU TO DO YOUR WORK, TAKE CARE OF THINGS AT HOME, OR GET ALONG WITH OTHER PEOPLE: NOT DIFFICULT AT ALL
SUM OF ALL RESPONSES TO PHQ QUESTIONS 1-9: 2
1. LITTLE INTEREST OR PLEASURE IN DOING THINGS: SEVERAL DAYS
4. FEELING TIRED OR HAVING LITTLE ENERGY: NOT AT ALL
7. TROUBLE CONCENTRATING ON THINGS, SUCH AS READING THE NEWSPAPER OR WATCHING TELEVISION: NOT AT ALL
2. FEELING DOWN, DEPRESSED OR HOPELESS: SEVERAL DAYS
SUM OF ALL RESPONSES TO PHQ QUESTIONS 1-9: 2

## 2025-06-12 NOTE — PROGRESS NOTES
Annual Exam      CC:   Chief Complaint   Patient presents with    Annual Exam       HPI:  35 y.o. G0 presents for her gynecologic annual exam.    Patient seen and examined. Doing well today.     Has had 2 cycles since delivery, both were normal.      Elevated BP today.  Reports she checked BP prior to appointment and was 102/76.    Medical history significant for acne.  Surgical history includes 4 knee surgeries.  Denies changes to medical history.     Obstetric history significant for NSVDx1.   Pregnancy was complicated by gestational hypertension, nausea/vomiting and varicose veins.      Health Maintenance:  Birth control: Condoms   Pregnancy plans: none currently   Safe relationship:  - together 5.5 years   Healthy diet: No specific plan   Exercise: Trying to increase     Screening:  Last pap smear: 2024 - NILM, neg HR HPV  History of abnormal pap smears: denies    Vaccines:  Gardasil vaccine: Has had   Flu vaccine: Has had   COVID-19 vaccine: Had series    Review of Systems:   Review of Systems   Constitutional:  Negative for chills and fever.   HENT:  Negative for congestion and sore throat.    Respiratory:  Negative for cough and shortness of breath.    Cardiovascular:  Negative for chest pain and palpitations.   Gastrointestinal:  Negative for abdominal pain, constipation, diarrhea, nausea and vomiting.   Genitourinary:  Negative for dysuria, frequency, menstrual problem, pelvic pain and vaginal discharge.   Neurological:  Negative for dizziness and headaches.   Breast: Denies skin changes, nipple discharge, lesions, dimpling, tenderness or palpable masses     Primary Care Physician: Alexandra Roque, APRN - CNP    Obstetric History  OB History    Para Term  AB Living   1 1 1   1   SAB IAB Ectopic Molar Multiple Live Births       0 1      # Outcome Date GA Lbr Jorge/2nd Weight Sex Type Anes PTL Lv   1 Term 24 37w2d 02:57 / 00:53 2.938 kg (6 lb 7.6 oz) F Vag-Spont EPI N IRAJ

## 2025-06-12 NOTE — PROGRESS NOTES
Alexandra Elmore (:  1990) is a 35 y.o. female,Established patient, here for evaluation of the following chief complaint(s):  Hypertension       Assessment & Plan  Elevated blood pressure reading    Improved;  BP checked in the office and much improved.  Dicussed might be related to hx of trauma in the OB office.  Encouraged patient to consider looking into [facility]/ [facility].  Labs ordered.    Orders:    Basic Metabolic Panel; Future    TSH; Future    Screening for lipid disorders    Stable;  Labs ordered.    Orders:    Lipid Panel; Future      Return if symptoms worsen or fail to improve.       Subjective   HPI  No dizziness, lightheaded or chest pain  Feels better at work (has a 10 mos old)  Took BP medication 160 or higher- was never had high; never needed it  Had to be induced because of HYPERTENSION  BP always elevated in OB office- normal at home  No FH HYPERTENSION  No FH thyroid disease    Review of Systems       Objective   Physical Exam  Vitals reviewed.   Constitutional:       Appearance: Normal appearance.   HENT:      Head: Normocephalic.      Right Ear: External ear normal.      Left Ear: External ear normal.      Nose: Nose normal.   Cardiovascular:      Rate and Rhythm: Normal rate and regular rhythm.      Heart sounds: Normal heart sounds, S1 normal and S2 normal.   Pulmonary:      Effort: Pulmonary effort is normal.      Breath sounds: Normal breath sounds and air entry.   Musculoskeletal:      Right lower leg: No edema.      Left lower leg: No edema.   Neurological:      Mental Status: She is alert.   Psychiatric:         Mood and Affect: Mood normal.       An electronic signature was used to authenticate this note.    --Alexandra Roque, FATOUMATA - CNP

## 2025-06-13 DIAGNOSIS — R03.0 ELEVATED BLOOD PRESSURE READING: ICD-10-CM

## 2025-06-13 DIAGNOSIS — Z13.220 SCREENING FOR LIPID DISORDERS: ICD-10-CM

## 2025-06-13 LAB
ANION GAP SERPL CALCULATED.3IONS-SCNC: 10 MMOL/L (ref 3–16)
BUN SERPL-MCNC: 12 MG/DL (ref 7–20)
CALCIUM SERPL-MCNC: 9.5 MG/DL (ref 8.3–10.6)
CHLORIDE SERPL-SCNC: 104 MMOL/L (ref 99–110)
CHOLEST SERPL-MCNC: 205 MG/DL (ref 0–199)
CO2 SERPL-SCNC: 26 MMOL/L (ref 21–32)
CREAT SERPL-MCNC: 0.7 MG/DL (ref 0.6–1.1)
GFR SERPLBLD CREATININE-BSD FMLA CKD-EPI: >90 ML/MIN/{1.73_M2}
GLUCOSE SERPL-MCNC: 87 MG/DL (ref 70–99)
HDLC SERPL-MCNC: 53 MG/DL (ref 40–60)
LDLC SERPL CALC-MCNC: 132 MG/DL
POTASSIUM SERPL-SCNC: 4.5 MMOL/L (ref 3.5–5.1)
SODIUM SERPL-SCNC: 140 MMOL/L (ref 136–145)
TRIGL SERPL-MCNC: 98 MG/DL (ref 0–150)
TSH SERPL DL<=0.005 MIU/L-ACNC: 1.48 UIU/ML (ref 0.27–4.2)
VLDLC SERPL CALC-MCNC: 20 MG/DL

## 2025-06-15 ENCOUNTER — RESULTS FOLLOW-UP (OUTPATIENT)
Dept: FAMILY MEDICINE CLINIC | Age: 35
End: 2025-06-15

## 2025-08-22 ENCOUNTER — OFFICE VISIT (OUTPATIENT)
Dept: OBGYN CLINIC | Age: 35
End: 2025-08-22
Payer: COMMERCIAL

## 2025-08-22 VITALS
WEIGHT: 191.4 LBS | SYSTOLIC BLOOD PRESSURE: 128 MMHG | HEART RATE: 84 BPM | BODY MASS INDEX: 29.98 KG/M2 | DIASTOLIC BLOOD PRESSURE: 82 MMHG

## 2025-08-22 DIAGNOSIS — Z11.3 ROUTINE SCREENING FOR STI (SEXUALLY TRANSMITTED INFECTION): ICD-10-CM

## 2025-08-22 DIAGNOSIS — Z32.01 POSITIVE PREGNANCY TEST: ICD-10-CM

## 2025-08-22 DIAGNOSIS — N92.6 MISSED MENSES: Primary | ICD-10-CM

## 2025-08-22 PROCEDURE — 99213 OFFICE O/P EST LOW 20 MIN: CPT | Performed by: OBSTETRICS & GYNECOLOGY

## 2025-08-22 PROCEDURE — 81025 URINE PREGNANCY TEST: CPT | Performed by: OBSTETRICS & GYNECOLOGY

## 2025-08-22 RX ORDER — PYRIDOXINE HCL (VITAMIN B6) 25 MG
25 TABLET ORAL 3 TIMES DAILY
Qty: 90 TABLET | Refills: 3 | Status: SHIPPED | OUTPATIENT
Start: 2025-08-22 | End: 2026-08-22

## 2025-08-23 LAB
BACTERIA URNS QL MICRO: ABNORMAL /HPF
BILIRUB UR QL STRIP.AUTO: NEGATIVE
CLARITY UR: CLEAR
COLOR UR: YELLOW
EPI CELLS #/AREA URNS AUTO: 3 /HPF (ref 0–5)
GLUCOSE UR STRIP.AUTO-MCNC: NEGATIVE MG/DL
HGB UR QL STRIP.AUTO: NEGATIVE
HYALINE CASTS #/AREA URNS AUTO: 0 /LPF (ref 0–8)
KETONES UR STRIP.AUTO-MCNC: NEGATIVE MG/DL
LEUKOCYTE ESTERASE UR QL STRIP.AUTO: ABNORMAL
NITRITE UR QL STRIP.AUTO: NEGATIVE
PH UR STRIP.AUTO: 7 [PH] (ref 5–8)
PROT UR STRIP.AUTO-MCNC: NEGATIVE MG/DL
RBC CLUMPS #/AREA URNS AUTO: 0 /HPF (ref 0–4)
SP GR UR STRIP.AUTO: 1.01 (ref 1–1.03)
UA DIPSTICK W REFLEX MICRO PNL UR: YES
URN SPEC COLLECT METH UR: ABNORMAL
UROBILINOGEN UR STRIP-ACNC: 0.2 E.U./DL
WBC #/AREA URNS AUTO: 2 /HPF (ref 0–5)

## 2025-08-24 LAB — BACTERIA UR CULT: NORMAL

## 2025-08-26 LAB
C TRACH DNA CVX QL NAA+PROBE: NEGATIVE
N GONORRHOEA DNA CERV MUCUS QL NAA+PROBE: NEGATIVE